# Patient Record
Sex: FEMALE | Race: WHITE | NOT HISPANIC OR LATINO | Employment: OTHER | URBAN - METROPOLITAN AREA
[De-identification: names, ages, dates, MRNs, and addresses within clinical notes are randomized per-mention and may not be internally consistent; named-entity substitution may affect disease eponyms.]

---

## 2018-08-24 ENCOUNTER — TRANSCRIBE ORDERS (OUTPATIENT)
Dept: ADMINISTRATIVE | Facility: HOSPITAL | Age: 63
End: 2018-08-24

## 2018-08-24 DIAGNOSIS — Z12.39 SCREENING BREAST EXAMINATION: Primary | ICD-10-CM

## 2018-09-12 ENCOUNTER — HOSPITAL ENCOUNTER (OUTPATIENT)
Dept: MAMMOGRAPHY | Facility: HOSPITAL | Age: 63
Discharge: HOME | End: 2018-09-12
Payer: COMMERCIAL

## 2018-09-12 DIAGNOSIS — Z12.39 SCREENING BREAST EXAMINATION: ICD-10-CM

## 2018-09-12 PROCEDURE — 77067 SCR MAMMO BI INCL CAD: CPT

## 2018-09-12 PROCEDURE — 77063 BREAST TOMOSYNTHESIS BI: CPT

## 2019-08-20 ENCOUNTER — TRANSCRIBE ORDERS (OUTPATIENT)
Dept: ADMINISTRATIVE | Facility: HOSPITAL | Age: 64
End: 2019-08-20

## 2019-08-20 DIAGNOSIS — Z12.39 ENCOUNTER FOR SCREENING FOR MALIGNANT NEOPLASM OF BREAST: Primary | ICD-10-CM

## 2019-09-16 ENCOUNTER — HOSPITAL ENCOUNTER (OUTPATIENT)
Dept: MAMMOGRAPHY | Facility: HOSPITAL | Age: 64
Discharge: HOME | End: 2019-09-16
Payer: COMMERCIAL

## 2019-09-16 VITALS — HEIGHT: 63 IN | WEIGHT: 173 LBS | BODY MASS INDEX: 30.65 KG/M2

## 2019-09-16 DIAGNOSIS — Z12.39 ENCOUNTER FOR SCREENING FOR MALIGNANT NEOPLASM OF BREAST: ICD-10-CM

## 2019-09-16 PROCEDURE — 77063 BREAST TOMOSYNTHESIS BI: CPT

## 2019-09-16 PROCEDURE — 77067 SCR MAMMO BI INCL CAD: CPT

## 2020-07-09 ENCOUNTER — OFFICE VISIT (OUTPATIENT)
Dept: URGENT CARE | Facility: CLINIC | Age: 65
End: 2020-07-09
Payer: MEDICARE

## 2020-07-09 VITALS
SYSTOLIC BLOOD PRESSURE: 142 MMHG | BODY MASS INDEX: 30.65 KG/M2 | OXYGEN SATURATION: 99 % | RESPIRATION RATE: 16 BRPM | HEART RATE: 75 BPM | DIASTOLIC BLOOD PRESSURE: 73 MMHG | WEIGHT: 173 LBS | TEMPERATURE: 98.9 F | HEIGHT: 63 IN

## 2020-07-09 DIAGNOSIS — M65.342 TRIGGER RING FINGER OF LEFT HAND: Primary | ICD-10-CM

## 2020-07-09 PROCEDURE — G0463 HOSPITAL OUTPT CLINIC VISIT: HCPCS | Performed by: PHYSICIAN ASSISTANT

## 2020-07-09 PROCEDURE — 99203 OFFICE O/P NEW LOW 30 MIN: CPT | Performed by: PHYSICIAN ASSISTANT

## 2020-07-09 RX ORDER — UREA 10 %
1 LOTION (ML) TOPICAL DAILY
COMMUNITY

## 2020-07-09 NOTE — PROGRESS NOTES
[facility]        NAME: Gregoria Cotton is a 72 y o  female  : 1955    MRN: [id number]  DATE: 2020  TIME: 11:30 AM    Assessment and Plan   Trigger ring finger of left hand [M65 [id number]]  1  Trigger ring finger of left hand  Orthopedic injury treatment         Patient Instructions     Naproxen 500mg twice per day  Wear finger splint until seen by ortho specialist  Remove split if it becomes painful, tingles, becomes numb, changes color or temperature  Follow up with PCP in 3-5 days  Proceed to  ER if symptoms worsen  Chief Complaint     Chief Complaint   Patient presents with    Finger Pain     left 4th x 2 days         History of Present Illness       Patient has appointment with ortho specialist for this condition next week  Hand Pain    Incident onset: 2d  There was no injury mechanism  Pain location: L 4th finger  Quality: no pain  The pain does not radiate  The patient is experiencing no pain  Pertinent negatives include no numbness or tingling  The symptoms are aggravated by movement (difficulty and popping sensation at PIP with flexion and extension)  Review of Systems   Review of Systems   Constitutional: Negative for chills and fever  Musculoskeletal: Negative for joint swelling  Skin: Negative for color change and wound  Neurological: Negative for tingling, weakness and numbness  Current Medications       Current Outpatient Medications:     calcium carbonate (OS-TENZIN) 1250 (500 Ca) MG chewable tablet, Chew 1 tablet daily, Disp: , Rfl:     Current Allergies     Allergies as of 2020 - Reviewed 2020   Allergen Reaction Noted    Meperidine  2012            The following portions of the patient's history were reviewed and updated as appropriate: allergies, current medications, past family history, past medical history, past social history, past surgical history and problem list      History reviewed   No pertinent past medical history  History reviewed  No pertinent surgical history  Family History   Problem Relation Age of Onset    Breast cancer Mother 36    No Known Problems Sister     No Known Problems Maternal Grandmother     No Known Problems Paternal Grandmother     No Known Problems Sister     No Known Problems Sister     Cancer Maternal Aunt          Medications have been verified  Objective   /73   Pulse 75   Temp 98 9 °F (37 2 °C)   Resp 16   Ht 5' 3" (1 6 m)   Wt 78 5 kg (173 lb)   SpO2 99%   BMI 30 65 kg/m²          Physical Exam     Physical Exam   Constitutional: She is oriented to person, place, and time  She appears well-developed and well-nourished  No distress  Cardiovascular: Normal rate, regular rhythm, normal heart sounds and intact distal pulses  Exam reveals no gallop and no friction rub  No murmur heard  Pulmonary/Chest: Effort normal and breath sounds normal  No respiratory distress  She has no wheezes  She has no rales  She exhibits no tenderness  Musculoskeletal: Normal range of motion  She exhibits no edema, tenderness or deformity  Occasional delay or "pop" of L 4th PIP with flexion and extension   Neurological: She is alert and oriented to person, place, and time  She has normal reflexes  No sensory deficit  Skin: Skin is warm  No erythema  Psychiatric: She has a normal mood and affect  Her behavior is normal  Judgment and thought content normal    Vitals reviewed  Orthopedic injury treatment  Date/Time: 7/9/2020 11:30 AM  Performed by: Anastacia Thomas PA-C  Authorized by: Anastacia Thomas PA-C     Verbal consent obtained?: Yes    Risks and benefits: Risks, benefits and alternatives were discussed    Consent given by:  Patient  Injury location: L 4th finger    Neurovascular status: Neurovascularly intact    Distal perfusion: normal    Neurological function: normal    Range of motion: reduced    Immobilization:  Splint  Splint type:  Finger splint, static  Neurovascular status: Neurovascularly intact    Distal perfusion: normal    Neurological function: normal    Range of motion: unchanged    Patient tolerance:  Patient tolerated the procedure well with no immediate complications

## 2020-07-09 NOTE — PATIENT INSTRUCTIONS
Naproxen 500mg twice per day  Wear finger splint until seen by ortho specialist  Remove split if it becomes painful, tingles, becomes numb, changes color or temperature  Follow up with PCP in 3-5 days  Proceed to  ER if symptoms worsen  Trigger Finger   WHAT YOU NEED TO KNOW:   Trigger finger is when your finger or thumb gets stuck in a bent position and snaps, pops, or clicks when you straighten it  DISCHARGE INSTRUCTIONS:   Medicines:   · NSAIDs:  These medicines decrease pain and swelling  NSAIDs can be bought without a doctor's order  Ask which medicine is right for you and how much to take  Take as directed  NSAIDs can cause stomach bleeding or kidney problems if not taken correctly  · Take your medicine as directed  Contact your healthcare provider if you think your medicine is not helping or if you have side effects  Tell him of her if you are allergic to any medicine  Keep a list of the medicines, vitamins, and herbs you take  Include the amounts, and when and why you take them  Bring the list or the pill bottles to follow-up visits  Carry your medicine list with you in case of an emergency  Follow up with your healthcare provider or hand specialist as directed:  Write down your questions so you remember to ask them during your visits  Physical therapy:  A physical therapist teaches you exercises to help improve movement and strength, and to decrease pain  Splint:  You may need to wear a splint for up to 6 weeks to keep your finger straight  This will help your finger joints rest and prevent you from bending your finger while you sleep  Contact your healthcare provider or hand specialist if:   · Your symptoms do not go away or they return, even after treatment  · The pain, swelling, or stiffness interferes with your daily activities  · You have more trouble moving your finger  · Your finger is tingling  · You have questions or concerns about your condition or care    Return to the emergency department if:   · You cannot move your finger at all  · Your finger is numb  © 2017 [address]  Information is for End User's use only and may not be sold, redistributed or otherwise used for commercial purposes  All illustrations and images included in CareNotes® are the copyrighted property of [employer]  or [employer]  The above information is an  only  It is not intended as medical advice for individual conditions or treatments  Talk to your doctor, nurse or pharmacist before following any medical regimen to see if it is safe and effective for you

## 2020-07-14 ENCOUNTER — OFFICE VISIT (OUTPATIENT)
Dept: OBGYN CLINIC | Facility: CLINIC | Age: 65
End: 2020-07-14
Payer: MEDICARE

## 2020-07-14 VITALS
DIASTOLIC BLOOD PRESSURE: 85 MMHG | WEIGHT: 173 LBS | HEIGHT: 63 IN | BODY MASS INDEX: 30.65 KG/M2 | SYSTOLIC BLOOD PRESSURE: 129 MMHG | HEART RATE: 65 BPM

## 2020-07-14 DIAGNOSIS — S69.82XA HYPEREXTENSION INJURY OF FINGER OF LEFT HAND: Primary | ICD-10-CM

## 2020-07-14 PROCEDURE — 99203 OFFICE O/P NEW LOW 30 MIN: CPT | Performed by: ORTHOPAEDIC SURGERY

## 2020-07-14 NOTE — PROGRESS NOTES
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 and she noted a snapping at her PIP joint of her left ring finger  Patient states that it does not cause her pain although it is annoying  Pt is also concerned about a tremor that occurs in her hand at times when she holds her phone  PAST MEDICAL HISTORY:  History reviewed  No pertinent past medical history  PAST SURGICAL HISTORY:  History reviewed  No pertinent surgical history  FAMILY HISTORY:  Family History   Problem Relation Age of Onset    Breast cancer Mother 36    No Known Problems Sister     No Known Problems Maternal Grandmother     No Known Problems Paternal Grandmother     No Known Problems Sister     No Known Problems Sister     Cancer Maternal Aunt        SOCIAL HISTORY:  Social History     Tobacco Use    Smoking status: Never Smoker    Smokeless tobacco: Never Used   Substance Use Topics    Alcohol use: Not on file    Drug use: Not on file       MEDICATIONS:    Current Outpatient Medications:     calcium carbonate (OS-TENZIN) 1250 (500 Ca) MG chewable tablet, Chew 1 tablet daily, Disp: , Rfl:     ALLERGIES:  Allergies   Allergen Reactions    Meperidine        REVIEW OF SYSTEMS:  Review of Systems   Constitutional: Negative for chills, fever and unexpected weight change  HENT: Negative for hearing loss, nosebleeds and sore throat  Eyes: Negative for pain, redness and visual disturbance  Respiratory: Negative for cough, shortness of breath and wheezing  Cardiovascular: Negative for chest pain, palpitations and leg swelling  Gastrointestinal: Negative for abdominal pain, nausea and vomiting  Endocrine: Negative for polydipsia and polyuria  Genitourinary: Negative for dysuria and hematuria  Skin: Negative for rash and wound  Neurological: Negative for dizziness and headaches  Psychiatric/Behavioral: Negative for decreased concentration, dysphoric mood and suicidal ideas  The patient is not nervous/anxious  VITALS:  Vitals:    07/14/20 1006   BP: 129/85   Pulse: 65       LABS:  HgA1c: No results found for: HGBA1C  BMP: No results found for: GLUCOSE, CALCIUM, NA, K, CO2, CL, BUN, CREATININE    _____________________________________________________  PHYSICAL EXAMINATION:  General: well developed and well nourished, alert, oriented times 3 and appears comfortable  Psychiatric: Normal  HEENT: Trachea Midline, No torticollis  Pulmonary: No audible wheezing or strider  Cardiovascular: No discernable arrhythmia   Skin: No masses, erythema, lacerations, fluctation, ulcerations  Neurovascular: Sensation Intact to the Median, Ulnar, Radial Nerve, Motor Intact to the Median, Ulnar, Radial Nerve and Pulses Intact    MUSCULOSKELETAL EXAMINATION:  Left ring finger   hyperextension at PIP joint   Extensor tendon snapping noted with flexion at PIP joint  No tenderness at A1 pully no catching or locking     ___________________________________________________  STUDIES REVIEWED:  No studies reviewed  PROCEDURES PERFORMED:  Procedures  No Procedures performed today    _____________________________________________________  ASSESSMENT/PLAN:  Left ring finger extensor tendon irritation   * OT order was placed for a ring to avoid hyperextension and the snapping sensation  * pt was advised to Dc the splint that she is wearing because it likely causes increased irritation  * Pt was advised that if it continues to be bothersome there is a surgical procedure that can be performed    Tension tremor - hand   * Pt was advised that it is likely due to central nervous system          Follow Up:  Return if symptoms worsen or fail to improve        To Do Next Visit:  Re-evaluation of current issue        Scribe Attestation    I,:   Alyse Maguire am acting as a scribe while in the presence of the attending physician :        I,:   Hui Martinez MD personally performed the services described in this documentation    as scribed in my presence :

## 2021-05-27 ENCOUNTER — TRANSCRIBE ORDERS (OUTPATIENT)
Dept: ADMINISTRATIVE | Facility: HOSPITAL | Age: 66
End: 2021-05-27

## 2021-05-27 DIAGNOSIS — Z12.31 SCREENING MAMMOGRAM FOR HIGH-RISK PATIENT: Primary | ICD-10-CM

## 2021-05-28 ENCOUNTER — HOSPITAL ENCOUNTER (OUTPATIENT)
Dept: MAMMOGRAPHY | Facility: HOSPITAL | Age: 66
Discharge: HOME | End: 2021-05-28
Payer: MEDICARE

## 2021-05-28 VITALS — WEIGHT: 165 LBS | BODY MASS INDEX: 29.23 KG/M2 | HEIGHT: 63 IN

## 2021-05-28 DIAGNOSIS — Z12.31 SCREENING MAMMOGRAM FOR HIGH-RISK PATIENT: ICD-10-CM

## 2021-05-28 PROCEDURE — 77063 BREAST TOMOSYNTHESIS BI: CPT

## 2021-05-28 PROCEDURE — 77067 SCR MAMMO BI INCL CAD: CPT

## 2023-05-08 ENCOUNTER — TELEPHONE (OUTPATIENT)
Dept: FAMILY MEDICINE CLINIC | Facility: CLINIC | Age: 68
End: 2023-05-08

## 2023-05-08 NOTE — TELEPHONE ENCOUNTER
Pt is scheduled for new pt appt w/Dr Catalan 06/27/23  Jojo Vee She is asking if Dr Marissa Chavez would order a mammo for her    She is having a little discomfort in left breast  Also asking if routine labs can be ordered so she can get them prior to her appt  Jojo Vee

## 2023-05-08 NOTE — TELEPHONE ENCOUNTER
Would recommend a visit prior to ordering any labs as depending on what we talk about it may change what we check   This is true of breast imaging as well -- depending on what is going on it changes the type of imaging we order

## 2023-05-08 NOTE — TELEPHONE ENCOUNTER
Spoke with Cat Garner and also advised her to check with GYN , since she is established there  They maybe able to order Mammo   Otherwise she will see you in [place]

## 2023-06-27 ENCOUNTER — OFFICE VISIT (OUTPATIENT)
Dept: FAMILY MEDICINE CLINIC | Facility: CLINIC | Age: 68
End: 2023-06-27
Payer: MEDICARE

## 2023-06-27 VITALS
HEART RATE: 76 BPM | DIASTOLIC BLOOD PRESSURE: 78 MMHG | SYSTOLIC BLOOD PRESSURE: 128 MMHG | BODY MASS INDEX: 28.17 KG/M2 | TEMPERATURE: 97.6 F | HEIGHT: 63 IN | OXYGEN SATURATION: 99 % | WEIGHT: 159 LBS

## 2023-06-27 DIAGNOSIS — E46 PROTEIN-CALORIE MALNUTRITION, UNSPECIFIED SEVERITY (HCC): ICD-10-CM

## 2023-06-27 DIAGNOSIS — Z12.11 SCREEN FOR COLON CANCER: ICD-10-CM

## 2023-06-27 DIAGNOSIS — R53.83 OTHER FATIGUE: ICD-10-CM

## 2023-06-27 DIAGNOSIS — L65.9 HAIR LOSS: ICD-10-CM

## 2023-06-27 DIAGNOSIS — L21.9 SEBORRHEIC DERMATITIS: Primary | ICD-10-CM

## 2023-06-27 DIAGNOSIS — L60.8 CHANGE IN NAIL APPEARANCE: ICD-10-CM

## 2023-06-27 DIAGNOSIS — Z12.31 SCREENING MAMMOGRAM FOR BREAST CANCER: ICD-10-CM

## 2023-06-27 DIAGNOSIS — Z00.00 MEDICARE ANNUAL WELLNESS VISIT, INITIAL: ICD-10-CM

## 2023-06-27 DIAGNOSIS — E55.9 VITAMIN D DEFICIENCY: ICD-10-CM

## 2023-06-27 DIAGNOSIS — Z13.1 SCREENING FOR DIABETES MELLITUS: ICD-10-CM

## 2023-06-27 DIAGNOSIS — Z13.220 SCREENING, LIPID: ICD-10-CM

## 2023-06-27 PROCEDURE — 99204 OFFICE O/P NEW MOD 45 MIN: CPT | Performed by: FAMILY MEDICINE

## 2023-06-27 PROCEDURE — G0438 PPPS, INITIAL VISIT: HCPCS | Performed by: FAMILY MEDICINE

## 2023-06-27 RX ORDER — CLOTRIMAZOLE AND BETAMETHASONE DIPROPIONATE 10; .64 MG/G; MG/G
CREAM TOPICAL 2 TIMES DAILY
Qty: 30 G | Refills: 0 | Status: SHIPPED | OUTPATIENT
Start: 2023-06-27

## 2023-06-27 NOTE — PROGRESS NOTES
Assessment and Plan:     Problem List Items Addressed This Visit    None  Visit Diagnoses     Seborrheic dermatitis    -  Primary    Relevant Medications    clotrimazole-betamethasone (LOTRISONE) 1-0 05 % cream    Hair loss        Relevant Orders    CBC and differential    Iron Panel (Includes Ferritin, Iron Sat%, Iron, and TIBC)    TSH, 3rd generation with Free T4 reflex    Change in nail appearance        Relevant Orders    CBC and differential    Iron Panel (Includes Ferritin, Iron Sat%, Iron, and TIBC)    TSH, 3rd generation with Free T4 reflex    Other fatigue        Relevant Orders    CBC and differential    Iron Panel (Includes Ferritin, Iron Sat%, Iron, and TIBC)    TSH, 3rd generation with Free T4 reflex    Vitamin B12    Vitamin D 25 hydroxy    Vitamin D deficiency        Relevant Orders    Vitamin D 25 hydroxy    Protein-calorie malnutrition, unspecified severity ([facility] [address] )        Relevant Orders    Vitamin B12    Medicare annual wellness visit, initial        Screening mammogram for breast cancer        Relevant Orders    Mammo screening bilateral w 3d & cad    Screen for colon cancer        Relevant Orders    Cologuard    Screening for diabetes mellitus        Relevant Orders    Comprehensive metabolic panel    Screening, lipid        Relevant Orders    Lipid panel        Rash suggestive of seborrheic dermatitis -- trial Lotrisone; to f/u if no improvement   Update labs as above to evaluate for possible anemia, thyroid dysfunction, vitamin deficiency given symptoms as below   Tremor appears more at rest, able to stop with intention, no shuffling gait to suggest Parkinsonism -- possible essential tremor, will continue to monitor and evaluate labs as above   Discussed diet sources of protein including meat, diary, protein supplements (which can be used between meals)        Preventive health issues were discussed with patient, and age appropriate screening tests were ordered as noted in patient's After Visit "Summary  Personalized health advice and appropriate referrals for health education or preventive services given if needed, as noted in patient's After Visit Summary  History of Present Illness:     Patient presents to [facility] care and for a Medicare Wellness Visit    HPI     Rash -- first noted in March/April   Not pruritic, painful; occasionally has overlying dry skin, only oozes if she scratches it   Hasn't tried anything on it   No new exposures      Feels like she is not getting enough protein (thinks she is only getting about 30 g/day) -- has hair loss, shakes, nail ridges, poor muscle tone  Also thinks she is getting arthritis in her L hand, can't straighten it out completely  Doesn't hurt but has decreased \"functionality\"     Patient Care Team:  Rebel Hernández DO as PCP - General (Family Medicine)  Rebel Hernández DO (Family Medicine)     Review of Systems:     Review of Systems   Constitutional: Negative for unexpected weight change  HENT: Negative for congestion, ear pain, rhinorrhea and sore throat  Eyes: Negative for visual disturbance  Respiratory: Negative for cough and shortness of breath  Cardiovascular: Negative for chest pain, palpitations and leg swelling  Gastrointestinal: Negative for abdominal pain, constipation and diarrhea  (+) hemorrhoid -- was bothering her more in April, now much less   Endocrine: Negative for polyuria  Genitourinary: Negative for dysuria and vaginal bleeding  Musculoskeletal: Positive for arthralgias  Skin: Positive for rash (around her mouth)  Neurological: Positive for tremors (usually when she is tried; can stop it if she thinks about it)  Negative for dizziness and headaches  Psychiatric/Behavioral: Positive for sleep disturbance (6-7 hours total, but broken up )  Problem List:     There is no problem list on file for this patient  Past Medical and Surgical History:     History reviewed   No pertinent past medical " history  Past Surgical History:   Procedure Laterality Date   • TONSILLECTOMY     • TUBAL LIGATION        Family History:     Family History   Problem Relation Age of Onset   • Breast cancer Mother 36   • Dementia Father    • No Known Problems Sister    • No Known Problems Sister    • No Known Problems Sister    • No Known Problems Maternal Grandmother    • Prostate cancer Maternal Grandfather    • No Known Problems Paternal Grandmother    • Cancer Maternal Aunt       Social History:     Social History     Socioeconomic History   • Marital status: Single     Spouse name: None   • Number of children: None   • Years of education: None   • Highest education level: None   Occupational History   • None   Tobacco Use   • Smoking status: Never   • Smokeless tobacco: Never   Vaping Use   • Vaping Use: Never used   Substance and Sexual Activity   • Alcohol use: Not Currently   • Drug use: Not Currently   • Sexual activity: None   Other Topics Concern   • None   Social History Narrative    Lives with Diamond Grubbs     Retired -- was a /      Social Determinants of Health     Financial Resource Strain: Low Risk  (6/27/2023)    Overall Financial Resource Strain (CARDIA)    • Difficulty of Paying Living Expenses: Not very hard   Food Insecurity: Not on file   Transportation Needs: No Transportation Needs (6/27/2023)    PRAPARE - Transportation    • Lack of Transportation (Medical): No    • Lack of Transportation (Non-Medical):  No   Physical Activity: Not on file   Stress: Not on file   Social Connections: Not on file   Intimate Partner Violence: Not on file   Housing Stability: Not on file      Medications and Allergies:     Current Outpatient Medications   Medication Sig Dispense Refill   • Cholecalciferol 100 MCG (4000 UT) CAPS Take 4,000 mg by mouth daily     • clotrimazole-betamethasone (LOTRISONE) 1-0 05 % cream Apply topically 2 (two) times a day 30 g 0   • [employer] FIBER GUMMIES PO Take by mouth     • Multiple Vitamins-Minerals (Centrum Silver 50+Women) TABS        No current facility-administered medications for this visit  Allergies   Allergen Reactions   • Meperidine    • Pistachio Nut (Diagnostic) - Food Allergy Hives      Immunizations:     Immunization History   Administered Date(s) Administered   • COVID-19 J&J (Baltazar) vaccine 0 5 mL 04/05/2021, 10/29/2021   • COVID-19 MODERNA VACC 0 5 ML IM 10/10/2022   • H1N1, All Formulations 10/23/2009   • INFLUENZA 09/28/2014, 10/21/2015, 10/04/2016   • Influenza Split High Dose Preservative Free IM 10/15/2020   • Tdap 09/29/2016   • Tetanus, adsorbed 01/01/2006   • Zoster 12/06/2013      Health Maintenance:         Topic Date Due   • Colorectal Cancer Screening  Never done   • Breast Cancer Screening: Mammogram  05/28/2022   • Hepatitis C Screening  06/27/2024 (Originally 1955)         Topic Date Due   • Pneumococcal Vaccine: 65+ Years (1 - PCV) Never done   • COVID-19 Vaccine (4 - Booster for Baltazar series) 12/05/2022   • Influenza Vaccine (Season Ended) 09/01/2023      Medicare Screening Tests and Risk Assessments:     Hattie Kaufman is here for her Subsequent Wellness visit  Health Risk Assessment:   Patient rates overall health as fair  Patient feels that their physical health rating is slightly worse  Patient is satisfied with their life  Eyesight was rated as same  Hearing was rated as same  Patient feels that their emotional and mental health rating is same  Patients states they are never, rarely angry  Patient states they are always unusually tired/fatigued  Pain experienced in the last 7 days has been none  Patient states that she has experienced no weight loss or gain in last 6 months  Depression Screening:   PHQ-2 Score: 0      Fall Risk Screening: In the past year, patient has experienced: no history of falling in past year      Urinary Incontinence Screening:   Patient has not leaked urine accidently in the last six months       Home Safety:  Patient does not have trouble with stairs inside or outside of their home  Patient has working smoke alarms and has working carbon monoxide detector  Nutrition:   Current diet is Regular  Medications:   Patient is currently taking over-the-counter supplements  OTC medications include: see medication list  Patient is able to manage medications  Activities of Daily Living (ADLs)/Instrumental Activities of Daily Living (IADLs):   Walk and transfer into and out of bed and chair?: Yes  Dress and groom yourself?: Yes    Bathe or shower yourself?: Yes    Feed yourself? Yes  Do your laundry/housekeeping?: Yes  Manage your money, pay your bills and track your expenses?: Yes  Make your own meals?: Yes    Do your own shopping?: Yes    Durable Medical Equipment Suppliers  N/A    Previous Hospitalizations:   Any hospitalizations or ED visits within the last 12 months?: No      Advance Care Planning:   Living will: No      Cognitive Screening:   Provider or family/friend/caregiver concerned regarding cognition?: No    PREVENTIVE SCREENINGS      Cardiovascular Screening:      Due for: Lipid Panel      Diabetes Screening:       Due for: Blood Glucose      Colorectal Cancer Screening:       Due for: Cologuard      Breast Cancer Screening:       Due for: Mammogram        Cervical Cancer Screening:    General: Screening Not Indicated      Osteoporosis Screening:    General: Patient Declines    Due for: Bone Density Ultrasound      Abdominal Aortic Aneurysm (AAA) Screening:        General: Screening Not Indicated      Lung Cancer Screening:     General: Screening Not Indicated      Hepatitis C Screening:    General: Patient Declines    Screening, Brief Intervention, and Referral to Treatment (SBIRT)    Screening  Typical number of drinks in a day: 0  Typical number of drinks in a week: 0  Interpretation: Low risk drinking behavior  No results found       Physical Exam:     /78   Pulse 76   Temp 97 6 °F "(36 4 °C)   Ht 5' 3\" (1 6 m)   Wt 72 1 kg (159 lb)   SpO2 99%   BMI 28 17 kg/m²     Physical Exam  Vitals and nursing note reviewed  Constitutional:       General: She is not in acute distress  Appearance: She is well-developed  HENT:      Head: Normocephalic and atraumatic  Comments: Area of mildly erythematous, dry peeling skin patches in distribution as above suggestive of seborrheic dermatitis      Right Ear: Tympanic membrane, ear canal and external ear normal       Left Ear: Tympanic membrane, ear canal and external ear normal       Nose: Nose normal  No rhinorrhea  Mouth/Throat:      Mouth: Mucous membranes are moist       Pharynx: No oropharyngeal exudate or posterior oropharyngeal erythema  Eyes:      Conjunctiva/sclera: Conjunctivae normal    Neck:      Thyroid: No thyromegaly  Cardiovascular:      Rate and Rhythm: Normal rate and regular rhythm  Pulmonary:      Effort: Pulmonary effort is normal  No respiratory distress  Breath sounds: Normal breath sounds  Abdominal:      General: Bowel sounds are normal  There is no distension  Palpations: Abdomen is soft  Tenderness: There is no abdominal tenderness  Musculoskeletal:         General: Normal range of motion  Lymphadenopathy:      Cervical: No cervical adenopathy  Skin:     General: Skin is warm and dry  Neurological:      General: No focal deficit present  Mental Status: She is alert  Motor: Tremor (B/L hands L>R, able to stop with intention) present        Gait: Gait normal    Psychiatric:         Mood and Affect: Mood normal        COVID completed primary + booster, TDap UTD, Shingles deferred, [place] completed 13, is going to     [employer], DO  "

## 2023-06-27 NOTE — PATIENT INSTRUCTIONS
Medicare Preventive Visit Patient Instructions  Thank you for completing your Welcome to Medicare Visit or Medicare Annual Wellness Visit today  Your next wellness visit will be due in one year (6/27/2024)  The screening/preventive services that you may require over the next 5-10 years are detailed below  Some tests may not apply to you based off risk factors and/or age  Screening tests ordered at today's visit but not completed yet may show as past due  Also, please note that scanned in results may not display below  Preventive Screenings:  Service Recommendations Previous Testing/Comments   Colorectal Cancer Screening  * Colonoscopy    * Fecal Occult Blood Test (FOBT)/Fecal Immunochemical Test (FIT)  * Fecal DNA/Cologuard Test  * Flexible Sigmoidoscopy Age: 39-70 years old   Colonoscopy: every 10 years (may be performed more frequently if at higher risk)  OR  FOBT/FIT: every 1 year  OR  Cologuard: every 3 years  OR  Sigmoidoscopy: every 5 years  Screening may be recommended earlier than age 39 if at higher risk for colorectal cancer  Also, an individualized decision between you and your healthcare provider will decide whether screening between the ages of 74-80 would be appropriate  Colonoscopy: Not on file  FOBT/FIT: Not on file  Cologuard: Not on file  Sigmoidoscopy: Not on file          Breast Cancer Screening Age: 36 years old  Frequency: every 1-2 years  Not required if history of left and right mastectomy Mammogram: 05/28/2021        Cervical Cancer Screening Between the ages of 21-29, pap smear recommended once every 3 years  Between the ages of 33-67, can perform pap smear with HPV co-testing every 5 years     Recommendations may differ for women with a history of total hysterectomy, cervical cancer, or abnormal pap smears in past  Pap Smear: Not on file    Screening Not Indicated   Hepatitis C Screening Once for adults born between [facility]  More frequently in patients at high risk for Hepatitis C Hep C Antibody: Not on file        Diabetes Screening 1-2 times per year if you're at risk for diabetes or have pre-diabetes Fasting glucose: No results in last 5 years (No results in last 5 years)  A1C: No results in last 5 years (No results in last 5 years)      Cholesterol Screening Once every 5 years if you don't have a lipid disorder  May order more often based on risk factors  Lipid panel: Not on file          Other Preventive Screenings Covered by Medicare:  1  Abdominal Aortic Aneurysm (AAA) Screening: covered once if your at risk  You're considered to be at risk if you have a family history of AAA  2  Lung Cancer Screening: covers low dose CT scan once per year if you meet all of the following conditions: (1) Age 50-69; (2) No signs or symptoms of lung cancer; (3) Current smoker or have quit smoking within the last 15 years; (4) You have a tobacco smoking history of at least 20 pack years (packs per day multiplied by number of years you smoked); (5) You get a written order from a healthcare provider  3  Glaucoma Screening: covered annually if you're considered high risk: (1) You have diabetes OR (2) Family history of glaucoma OR (3)  aged 48 and older OR (3)  American aged 72 and older  3  Osteoporosis Screening: covered every 2 years if you meet one of the following conditions: (1) You're estrogen deficient and at risk for osteoporosis based off medical history and other findings; (2) Have a vertebral abnormality; (3) On glucocorticoid therapy for more than 3 months; (4) Have primary hyperparathyroidism; (5) On osteoporosis medications and need to assess response to drug therapy  · Last bone density test (DXA Scan): Not on file  5  HIV Screening: covered annually if you're between the age of 12-76  Also covered annually if you are younger than 13 and older than 72 with risk factors for HIV infection   For pregnant patients, it is covered up to 3 times per pregnancy  Immunizations:  Immunization Recommendations   Influenza Vaccine Annual influenza vaccination during flu season is recommended for all persons aged >= 6 months who do not have contraindications   Pneumococcal Vaccine   * Pneumococcal conjugate vaccine = PCV13 (Prevnar 13), PCV15 (Vaxneuvance), PCV20 (Prevnar 20)  * Pneumococcal polysaccharide vaccine = PPSV23 (Pneumovax) Adults 25-60 years old: 1-3 doses may be recommended based on certain risk factors  Adults 72 years old: 1-2 doses may be recommended based off what pneumonia vaccine you previously received   Hepatitis B Vaccine 3 dose series if at intermediate or high risk (ex: diabetes, end stage renal disease, liver disease)   Tetanus (Td) Vaccine - COST NOT COVERED BY MEDICARE PART B Following completion of primary series, a booster dose should be given every 10 years to maintain immunity against tetanus  Td may also be given as tetanus wound prophylaxis  Tdap Vaccine - COST NOT COVERED BY MEDICARE PART B Recommended at least once for all adults  For pregnant patients, recommended with each pregnancy  Shingles Vaccine (Shingrix) - COST NOT COVERED BY MEDICARE PART B  2 shot series recommended in those aged 48 and above     Health Maintenance Due:      Topic Date Due   • Hepatitis C Screening  Never done   • Colorectal Cancer Screening  Never done   • Breast Cancer Screening: Mammogram  05/28/2022     Immunizations Due:      Topic Date Due   • Pneumococcal Vaccine: 65+ Years (1 - PCV) Never done   • COVID-19 Vaccine (2 - Booster for [place] series) 12/24/2021   • Influenza Vaccine (Season Ended) 09/01/2023     Advance Directives   What are advance directives? Advance directives are legal documents that state your wishes and plans for medical care  These plans are made ahead of time in case you lose your ability to make decisions for yourself   Advance directives can apply to any medical decision, such as the treatments you want, and if you want to donate organs  What are the types of advance directives? There are many types of advance directives, and each state has rules about how to use them  You may choose a combination of any of the following:  · Living will: This is a written record of the treatment you want  You can also choose which treatments you do not want, which to limit, and which to stop at a certain time  This includes surgery, medicine, IV fluid, and tube feedings  · Durable power of  for healthcare [facility]): This is a written record that states who you want to make healthcare choices for you when you are unable to make them for yourself  This person, called a proxy, is usually a family member or a friend  You may choose more than 1 proxy  · Do not resuscitate (DNR) order:  A DNR order is used in case your heart stops beating or you stop breathing  It is a request not to have certain forms of treatment, such as CPR  A DNR order may be included in other types of advance directives  · Medical directive: This covers the care that you want if you are in a coma, near death, or unable to make decisions for yourself  You can list the treatments you want for each condition  Treatment may include pain medicine, surgery, blood transfusions, dialysis, IV or tube feedings, and a ventilator (breathing machine)  · Values history: This document has questions about your views, beliefs, and how you feel and think about life  This information can help others choose the care that you would choose  Why are advance directives important? An advance directive helps you control your care  Although spoken wishes may be used, it is better to have your wishes written down  Spoken wishes can be misunderstood, or not followed  Treatments may be given even if you do not want them  An advance directive may make it easier for your family to make difficult choices about your care         © Copyright [employer] 2018 Information is for End User's use only and may not be sold, redistributed or otherwise used for commercial purposes   All illustrations and images included in CareNotes® are the copyrighted property of A D A M , Inc  or [facility] Alma Delia Santos

## 2023-06-28 ENCOUNTER — APPOINTMENT (OUTPATIENT)
Dept: LAB | Facility: CLINIC | Age: 68
End: 2023-06-28
Payer: MEDICARE

## 2023-06-28 DIAGNOSIS — R53.83 OTHER FATIGUE: ICD-10-CM

## 2023-06-28 DIAGNOSIS — Z13.220 SCREENING, LIPID: ICD-10-CM

## 2023-06-28 DIAGNOSIS — Z13.1 SCREENING FOR DIABETES MELLITUS: ICD-10-CM

## 2023-06-28 DIAGNOSIS — L60.8 CHANGE IN NAIL APPEARANCE: ICD-10-CM

## 2023-06-28 DIAGNOSIS — E55.9 VITAMIN D DEFICIENCY: ICD-10-CM

## 2023-06-28 DIAGNOSIS — L65.9 HAIR LOSS: ICD-10-CM

## 2023-06-28 DIAGNOSIS — E46 PROTEIN-CALORIE MALNUTRITION, UNSPECIFIED SEVERITY (HCC): ICD-10-CM

## 2023-06-28 LAB
25(OH)D3 SERPL-MCNC: 73.9 NG/ML (ref 30–100)
ALBUMIN SERPL BCP-MCNC: 3.9 G/DL (ref 3.5–5)
ALP SERPL-CCNC: 71 U/L (ref 46–116)
ALT SERPL W P-5'-P-CCNC: 20 U/L (ref 12–78)
ANION GAP SERPL CALCULATED.3IONS-SCNC: 5 MMOL/L
AST SERPL W P-5'-P-CCNC: 13 U/L (ref 5–45)
BASOPHILS # BLD AUTO: 0.08 THOUSANDS/ÂΜL (ref 0–0.1)
BASOPHILS NFR BLD AUTO: 1 % (ref 0–1)
BILIRUB SERPL-MCNC: 0.61 MG/DL (ref 0.2–1)
BUN SERPL-MCNC: 17 MG/DL (ref 5–25)
CALCIUM SERPL-MCNC: 9.4 MG/DL (ref 8.3–10.1)
CHLORIDE SERPL-SCNC: 107 MMOL/L (ref 96–108)
CHOLEST SERPL-MCNC: 268 MG/DL
CO2 SERPL-SCNC: 27 MMOL/L (ref 21–32)
CREAT SERPL-MCNC: 1.01 MG/DL (ref 0.6–1.3)
EOSINOPHIL # BLD AUTO: 0.13 THOUSAND/ÂΜL (ref 0–0.61)
EOSINOPHIL NFR BLD AUTO: 2 % (ref 0–6)
ERYTHROCYTE [DISTWIDTH] IN BLOOD BY AUTOMATED COUNT: 12.3 % (ref 11.6–15.1)
FERRITIN SERPL-MCNC: 67 NG/ML (ref 11–307)
GFR SERPL CREATININE-BSD FRML MDRD: 57 ML/MIN/1.73SQ M
GLUCOSE P FAST SERPL-MCNC: 101 MG/DL (ref 65–99)
HCT VFR BLD AUTO: 41.5 % (ref 34.8–46.1)
HDLC SERPL-MCNC: 74 MG/DL
HGB BLD-MCNC: 13.5 G/DL (ref 11.5–15.4)
IMM GRANULOCYTES # BLD AUTO: 0.02 THOUSAND/UL (ref 0–0.2)
IMM GRANULOCYTES NFR BLD AUTO: 0 % (ref 0–2)
IRON SATN MFR SERPL: 26 % (ref 15–50)
IRON SERPL-MCNC: 83 UG/DL (ref 50–170)
LDLC SERPL CALC-MCNC: 175 MG/DL (ref 0–100)
LYMPHOCYTES # BLD AUTO: 2.69 THOUSANDS/ÂΜL (ref 0.6–4.47)
LYMPHOCYTES NFR BLD AUTO: 41 % (ref 14–44)
MCH RBC QN AUTO: 30.5 PG (ref 26.8–34.3)
MCHC RBC AUTO-ENTMCNC: 32.5 G/DL (ref 31.4–37.4)
MCV RBC AUTO: 94 FL (ref 82–98)
MONOCYTES # BLD AUTO: 0.53 THOUSAND/ÂΜL (ref 0.17–1.22)
MONOCYTES NFR BLD AUTO: 8 % (ref 4–12)
NEUTROPHILS # BLD AUTO: 3.2 THOUSANDS/ÂΜL (ref 1.85–7.62)
NEUTS SEG NFR BLD AUTO: 48 % (ref 43–75)
NONHDLC SERPL-MCNC: 194 MG/DL
NRBC BLD AUTO-RTO: 0 /100 WBCS
PLATELET # BLD AUTO: 321 THOUSANDS/UL (ref 149–390)
PMV BLD AUTO: 9.9 FL (ref 8.9–12.7)
POTASSIUM SERPL-SCNC: 4 MMOL/L (ref 3.5–5.3)
PROT SERPL-MCNC: 6.9 G/DL (ref 6.4–8.4)
RBC # BLD AUTO: 4.42 MILLION/UL (ref 3.81–5.12)
SODIUM SERPL-SCNC: 139 MMOL/L (ref 135–147)
TIBC SERPL-MCNC: 325 UG/DL (ref 250–450)
TRIGL SERPL-MCNC: 95 MG/DL
TSH SERPL DL<=0.05 MIU/L-ACNC: 1.82 UIU/ML (ref 0.45–4.5)
VIT B12 SERPL-MCNC: 281 PG/ML (ref 180–914)
WBC # BLD AUTO: 6.65 THOUSAND/UL (ref 4.31–10.16)

## 2023-06-28 PROCEDURE — 83540 ASSAY OF IRON: CPT

## 2023-06-28 PROCEDURE — 82607 VITAMIN B-12: CPT

## 2023-06-28 PROCEDURE — 85025 COMPLETE CBC W/AUTO DIFF WBC: CPT

## 2023-06-28 PROCEDURE — 36415 COLL VENOUS BLD VENIPUNCTURE: CPT

## 2023-06-28 PROCEDURE — 83550 IRON BINDING TEST: CPT

## 2023-06-28 PROCEDURE — 80061 LIPID PANEL: CPT

## 2023-06-28 PROCEDURE — 84443 ASSAY THYROID STIM HORMONE: CPT

## 2023-06-28 PROCEDURE — 82306 VITAMIN D 25 HYDROXY: CPT

## 2023-06-28 PROCEDURE — 82728 ASSAY OF FERRITIN: CPT

## 2023-06-28 PROCEDURE — 80053 COMPREHEN METABOLIC PANEL: CPT

## 2023-07-16 LAB — COLOGUARD RESULT REPORTABLE: NEGATIVE

## 2023-12-05 ENCOUNTER — TELEPHONE (OUTPATIENT)
Dept: FAMILY MEDICINE CLINIC | Facility: CLINIC | Age: 68
End: 2023-12-05

## 2023-12-07 ENCOUNTER — EVALUATION (OUTPATIENT)
Dept: PHYSICAL THERAPY | Age: 68
End: 2023-12-07
Payer: MEDICARE

## 2023-12-07 ENCOUNTER — APPOINTMENT (OUTPATIENT)
Dept: LAB | Facility: CLINIC | Age: 68
End: 2023-12-07
Payer: MEDICARE

## 2023-12-07 ENCOUNTER — APPOINTMENT (OUTPATIENT)
Dept: RADIOLOGY | Facility: CLINIC | Age: 68
End: 2023-12-07
Payer: MEDICARE

## 2023-12-07 ENCOUNTER — OFFICE VISIT (OUTPATIENT)
Dept: FAMILY MEDICINE CLINIC | Facility: CLINIC | Age: 68
End: 2023-12-07
Payer: MEDICARE

## 2023-12-07 VITALS
WEIGHT: 163 LBS | SYSTOLIC BLOOD PRESSURE: 122 MMHG | DIASTOLIC BLOOD PRESSURE: 82 MMHG | HEIGHT: 63 IN | TEMPERATURE: 97 F | HEART RATE: 86 BPM | OXYGEN SATURATION: 98 % | BODY MASS INDEX: 28.88 KG/M2

## 2023-12-07 DIAGNOSIS — R26.9 ABNORMAL GAIT: ICD-10-CM

## 2023-12-07 DIAGNOSIS — R25.1 TREMOR: Primary | ICD-10-CM

## 2023-12-07 DIAGNOSIS — Z13.1 SCREENING FOR DIABETES MELLITUS: ICD-10-CM

## 2023-12-07 DIAGNOSIS — R35.0 URINARY FREQUENCY: ICD-10-CM

## 2023-12-07 DIAGNOSIS — M25.642 STIFFNESS OF LEFT HAND JOINT: ICD-10-CM

## 2023-12-07 DIAGNOSIS — Z13.220 SCREENING, LIPID: ICD-10-CM

## 2023-12-07 DIAGNOSIS — E53.8 VITAMIN B12 DEFICIENCY: ICD-10-CM

## 2023-12-07 DIAGNOSIS — R53.83 OTHER FATIGUE: ICD-10-CM

## 2023-12-07 DIAGNOSIS — R25.1 TREMOR: ICD-10-CM

## 2023-12-07 DIAGNOSIS — R35.0 URINARY FREQUENCY: Primary | ICD-10-CM

## 2023-12-07 LAB
ALBUMIN SERPL BCP-MCNC: 4.4 G/DL (ref 3.5–5)
ALP SERPL-CCNC: 69 U/L (ref 34–104)
ALT SERPL W P-5'-P-CCNC: 15 U/L (ref 7–52)
ANION GAP SERPL CALCULATED.3IONS-SCNC: 10 MMOL/L
AST SERPL W P-5'-P-CCNC: 14 U/L (ref 13–39)
BASOPHILS # BLD AUTO: 0.05 THOUSANDS/ÂΜL (ref 0–0.1)
BASOPHILS NFR BLD AUTO: 1 % (ref 0–1)
BILIRUB SERPL-MCNC: 0.53 MG/DL (ref 0.2–1)
BUN SERPL-MCNC: 22 MG/DL (ref 5–25)
CALCIUM SERPL-MCNC: 10.1 MG/DL (ref 8.4–10.2)
CHLORIDE SERPL-SCNC: 104 MMOL/L (ref 96–108)
CHOLEST SERPL-MCNC: 239 MG/DL
CO2 SERPL-SCNC: 28 MMOL/L (ref 21–32)
CREAT SERPL-MCNC: 0.87 MG/DL (ref 0.6–1.3)
EOSINOPHIL # BLD AUTO: 0.09 THOUSAND/ÂΜL (ref 0–0.61)
EOSINOPHIL NFR BLD AUTO: 1 % (ref 0–6)
ERYTHROCYTE [DISTWIDTH] IN BLOOD BY AUTOMATED COUNT: 12.5 % (ref 11.6–15.1)
GFR SERPL CREATININE-BSD FRML MDRD: 68 ML/MIN/1.73SQ M
GLUCOSE P FAST SERPL-MCNC: 99 MG/DL (ref 65–99)
HCT VFR BLD AUTO: 41.5 % (ref 34.8–46.1)
HDLC SERPL-MCNC: 74 MG/DL
HGB BLD-MCNC: 13.8 G/DL (ref 11.5–15.4)
IMM GRANULOCYTES # BLD AUTO: 0.04 THOUSAND/UL (ref 0–0.2)
IMM GRANULOCYTES NFR BLD AUTO: 1 % (ref 0–2)
LDLC SERPL CALC-MCNC: 148 MG/DL (ref 0–100)
LYMPHOCYTES # BLD AUTO: 2.64 THOUSANDS/ÂΜL (ref 0.6–4.47)
LYMPHOCYTES NFR BLD AUTO: 38 % (ref 14–44)
MCH RBC QN AUTO: 31.8 PG (ref 26.8–34.3)
MCHC RBC AUTO-ENTMCNC: 33.3 G/DL (ref 31.4–37.4)
MCV RBC AUTO: 96 FL (ref 82–98)
MONOCYTES # BLD AUTO: 0.48 THOUSAND/ÂΜL (ref 0.17–1.22)
MONOCYTES NFR BLD AUTO: 7 % (ref 4–12)
NEUTROPHILS # BLD AUTO: 3.67 THOUSANDS/ÂΜL (ref 1.85–7.62)
NEUTS SEG NFR BLD AUTO: 52 % (ref 43–75)
NRBC BLD AUTO-RTO: 0 /100 WBCS
PLATELET # BLD AUTO: 341 THOUSANDS/UL (ref 149–390)
PMV BLD AUTO: 10.1 FL (ref 8.9–12.7)
POTASSIUM SERPL-SCNC: 3.8 MMOL/L (ref 3.5–5.3)
PROT SERPL-MCNC: 6.5 G/DL (ref 6.4–8.4)
RBC # BLD AUTO: 4.34 MILLION/UL (ref 3.81–5.12)
SODIUM SERPL-SCNC: 142 MMOL/L (ref 135–147)
TRIGL SERPL-MCNC: 87 MG/DL
WBC # BLD AUTO: 6.97 THOUSAND/UL (ref 4.31–10.16)

## 2023-12-07 PROCEDURE — 80053 COMPREHEN METABOLIC PANEL: CPT

## 2023-12-07 PROCEDURE — 97162 PT EVAL MOD COMPLEX 30 MIN: CPT | Performed by: PHYSICAL THERAPIST

## 2023-12-07 PROCEDURE — 82607 VITAMIN B-12: CPT

## 2023-12-07 PROCEDURE — 36415 COLL VENOUS BLD VENIPUNCTURE: CPT

## 2023-12-07 PROCEDURE — 97530 THERAPEUTIC ACTIVITIES: CPT | Performed by: PHYSICAL THERAPIST

## 2023-12-07 PROCEDURE — 80061 LIPID PANEL: CPT

## 2023-12-07 PROCEDURE — 99214 OFFICE O/P EST MOD 30 MIN: CPT | Performed by: FAMILY MEDICINE

## 2023-12-07 PROCEDURE — 84443 ASSAY THYROID STIM HORMONE: CPT

## 2023-12-07 PROCEDURE — 97110 THERAPEUTIC EXERCISES: CPT | Performed by: PHYSICAL THERAPIST

## 2023-12-07 PROCEDURE — 73130 X-RAY EXAM OF HAND: CPT

## 2023-12-07 PROCEDURE — 85025 COMPLETE CBC W/AUTO DIFF WBC: CPT

## 2023-12-07 NOTE — PROGRESS NOTES
PT Evaluation     Today's date: 2023  Patient name: Rosemary Morse  : 1955  MRN: [id number]  Referring provider: Delfino Suero DO  Dx:   Encounter Diagnosis     ICD-10-CM    1. Urinary frequency  R35.0           Start Time: 1125  Stop Time: 1220  Total time in clinic (min): 55 minutes    Assessment  Assessment details: Noemy Gomez is a 76year old female presenting today with concerns of urinary frequency. Notes decline in gait and mobility and has increased urinary frequency to first urge for fear of not getting to the bathroom in time. Nocturia up to 4 times per night near every two hours. Time spent in patient education regarding toileting body mechanics, PFM anatomy, bladder health including adequate fluid intake, avoiding bladder irritants and urge suppression techniques. Patient could benefit from a trial of PFPT to address presenting impairments and functional limitations and improve overall quality of life. Direct PFM examination deferred until next treatment session. Barriers to therapy: Limited mobility  Back pain  Gait dysfunction  Freezing episodes  Understanding of Dx/Px/POC: good   Prognosis: fair    Goals  ST. Patient will demonstrate independence in an ongoing home exercise program that will be ongoing throughout episode of care. 2.. Patient will complete a two day bladder diary within two weeks. 3.  Patient will demonstrate proper posture for best voiding and defecation within two weeks. LT. Patient will demonstrate use of a functional PFM contraction by performing a pre-contraction ("knack") prior to transfers  2. Patient will perform 30 minutes of exercise without urinary leakage. 3.  Patient will have increased time of  3  hours between voids for increased participation in social and recreational activities  and improved sleep within twelve weeks. 4.  Increased PFM strength to  3/5    at 5 second hold for 10 repetitions within 12 weeks   5.  Positive use of urge suppression techniques to increase void interval  6. Increase fluid intake to 50-60 ounces of mostly non-irritant per day with limiting fluids 3 hours prior to bedtime    Plan  Patient would benefit from: skilled physical therapy  Planned therapy interventions: manual therapy, motor coordination training, neuromuscular re-education, patient education, behavior modification, therapeutic activities, therapeutic exercise and home exercise program  Frequency: 1x week  Duration in visits: 8  Plan of Care beginning date: 12/7/2023  Plan of Care expiration date: 3/6/2024        PT Pelvic Floor Subjective:   History of Present Illness:   Patient notes for greater than one year notes urinating every two hours during the day and night. Notes occasional leakage on the way to the bathroom but happening less than once per week. Wears liner when out in community. Most bothered by nocturia. Quality of life: good    Social Support:     Lives in:  Multiple-level home    Lives with:  Significant other    Relationship status: domestic partnership    Work status: retired    Life stress severity: mild  Diet and Exercise:      Used to walk at home around pool 5/8 of a mile but over the last month less because of fatigue and back pain and limited mobility  OB/ gyn History    Gestational History:     Number of term pregnancies: 0    Menstrual History:      Menopause: age 58. Bladder Function:     Voiding Difficulties positive for: frequent urination  Urgency: occasionally. Voiding Difficulties comments:     Voiding frequency: every 1-2 hours    Urinary leakage: urine leakage    Nocturia (episodes per night): 4 (every two hours)    Fluid Intake Type: Water and soda    Intake (ounces):  Water: 40, Soda: 12,     Intake (ounces) comment: Every other day caffeine free soda  Ensure every third day 8 ounces  Incontinence Management:     Pads/Diapers Additional Comments: liners when out in community otherwise no bladder protection  Bowel Function:     Voiding DIfficulties: constipation      Bowel Function comments:  Recently started using a raised toilet seat with now more difficulty with defecation although greater ease with transfers    Bowel frequency: daily    Uses "squatty potty": no Squatty Potty (advised use today)  Sexual Function:     Sexually Active:  Not sexually active  Pain:     Location:  Notes occasional left hip pain with sleeping; back pain daily  Patient Goals:     Patient goals for therapy:  Improved sleep, improved bladder or bowel function and improved quality of life    Other patient goals:  Less urinary frequency      Objective     Concurrent Complaints  Positive for disturbed sleep.  Negative for night pain, bowel dysfunction and saddle (S4) numbness    Postural Observations  Seated posture: poor  Standing posture: poor    Additional Postural Observation Details  Significant forward head, increased thoracic kyphosis and forward flexed in sitting    Neurological Testing     Sensation     Lumbar   Left   Intact: light touch    Right   Intact: light touch    Strength/Myotome Testing     Left Hip   Planes of Motion   Flexion: 4-  Abduction: 3+  Adduction: 4-    Right Hip   Planes of Motion   Flexion: 4-  Abduction: 3+  Adduction: 4-    Left Knee   Flexion: 4-  Extension: 4-    Right Knee   Flexion: 4-  Extension: 4-      Abdominal Assessment:      Abdominal Assessment: Deferred until next treatment session                   POC expires: 3/1/24  Date 12/7            Visit count 1            OSMAR 36                 Precautions: gait dysfunction, fall risk      Manuals 12/7                                                                Neuro Re-Ed                                                                                                        Ther Ex             CRK                                                                                                        Ther Activity             PFM education PP Bladder health education PP with ROSSY gomez PP                         Gait Training                                       Modalities

## 2023-12-07 NOTE — PROGRESS NOTES
Sabine Hawkins 1955 female MRN: [id number]      ASSESSMENT/PLAN  Problem List Items Addressed This Visit    None  Visit Diagnoses     Tremor    -  Primary    Relevant Orders    Ambulatory Referral to Neurology    Ambulatory Referral to Physical Therapy    CBC and differential    TSH, 3rd generation with Free T4 reflex    Vitamin B12    Other fatigue        Relevant Orders    Ambulatory Referral to Neurology    Ambulatory Referral to Physical Therapy    CBC and differential    TSH, 3rd generation with Free T4 reflex    Vitamin B12    Abnormal gait        Relevant Orders    Ambulatory Referral to Neurology    Ambulatory Referral to Physical Therapy    CBC and differential    TSH, 3rd generation with Free T4 reflex    Vitamin B12    Stiffness of left hand joint        Relevant Orders    XR hand 3+ vw left    Ambulatory referral to Hand Surgery    Urinary frequency        Relevant Orders    Ambulatory Referral to Physical Therapy    Screening for diabetes mellitus        Relevant Orders    Comprehensive metabolic panel    Screening, lipid        Relevant Orders    Lipid Panel with Direct LDL reflex    Vitamin B12 deficiency        Relevant Orders    Vitamin B12        Tremor and shuffling gait concerning for possible Parkinsonism -- will refer to Neuro for further evaluation (staff message sent to provider). Will also refer to PT to help with strength/stamina/fall prevention in meantime. Will XR L hand to evaluate for bony changes, though exam somewhat suggestive of contracture -- refer to Ortho to discuss possible injections, discussed trial of topical voltaren for discomfort. Reviewed treatment options for OAB symptoms including medication, pelvic floor PT, and/or referral to Urogyn. Pt agreeable to trial of PT -- referral provided. Update labs as above. No future appointments. SUBJECTIVE  CC: Fatigue (Has had fatigue since summer-was started on b12 and increased protein in diet.  Feels that it is much worse since fall. ), Gait Problem, and Hand Pain (arthritis)      HPI:  Artie Durand is a 76 y.o. female who presents with her partner due to multiple concern as detailed below. Pt notes she is very tired, and she used to be an active person   She continues to have a tremor, but feels it is worsening -- notes it is more present when she is tense, but she is able to stop it if she thinks about it, does not bother her at night   Having difficulty walking -- when she first gets up she is "frozen" and then has a shuffling gait more often than not, has trouble turning   Back hurts with standing for more than a few minutes   Stiffness in L hand -- thinks it is due to arthritis   Has to void every 2 hours     Review of Systems   Constitutional:  Positive for fatigue. Genitourinary:  Positive for frequency. Negative for difficulty urinating, dysuria and hematuria. Musculoskeletal:  Positive for arthralgias, back pain and gait problem. Neurological:  Positive for tremors. Historical Information   The patient history was reviewed and updated as follows:    History reviewed. No pertinent past medical history.   Past Surgical History:   Procedure Laterality Date   • TONSILLECTOMY     • TUBAL LIGATION       Family History   Problem Relation Age of Onset   • Breast cancer Mother 36   • Dementia Father    • No Known Problems Sister    • No Known Problems Sister    • No Known Problems Sister    • No Known Problems Maternal Grandmother    • Prostate cancer Maternal Grandfather    • No Known Problems Paternal Grandmother    • Cancer Maternal Aunt       Social History   Social History     Substance and Sexual Activity   Alcohol Use Not Currently     Social History     Substance and Sexual Activity   Drug Use Not Currently     Social History     Tobacco Use   Smoking Status Never   Smokeless Tobacco Never       Medications:     Current Outpatient Medications:   •  Cholecalciferol 100 MCG (4000 UT) CAPS, Take 4,000 mg by mouth daily, Disp: , Rfl:   •  clotrimazole-betamethasone (LOTRISONE) 1-0.05 % cream, Apply topically 2 (two) times a day, Disp: 30 g, Rfl: 0  •  [employer] FIBER GUMMIES PO, Take by mouth, Disp: , Rfl:   •  Multiple Vitamins-Minerals (Centrum Silver 50+Women) TABS, , Disp: , Rfl:   Allergies   Allergen Reactions   • Meperidine    • Pistachio Nut (Diagnostic) - Food Allergy Hives       OBJECTIVE    Vitals:   Vitals:    12/07/23 0855   BP: 122/82   Pulse: 86   Temp: (!) 97 °F (36.1 °C)   SpO2: 98%   Weight: 73.9 kg (163 lb)   Height: 5' 3" (1.6 m)           Physical Exam  Vitals and nursing note reviewed. Constitutional:       General: She is not in acute distress. Appearance: Normal appearance. HENT:      Head: Normocephalic and atraumatic. Pulmonary:      Effort: Pulmonary effort is normal. No respiratory distress. Musculoskeletal:      Comments: Unable to fully extend L fingers with active ROM, though can fully reach with passive force   Has to "unlock" hand to make fist    Neurological:      Mental Status: She is alert.       Comments: B/L hand tremor more prominent with intention, but intermittently present at rest   Shuffling gait, most notable when trying to turn    Psychiatric:         Mood and Affect: Mood normal.                    Shar Broderick DO  [facility]   12/7/2023  9:31 AM

## 2023-12-08 ENCOUNTER — PATIENT MESSAGE (OUTPATIENT)
Dept: FAMILY MEDICINE CLINIC | Facility: CLINIC | Age: 68
End: 2023-12-08

## 2023-12-08 LAB
TSH SERPL DL<=0.05 MIU/L-ACNC: 1.82 UIU/ML (ref 0.45–4.5)
VIT B12 SERPL-MCNC: 529 PG/ML (ref 180–914)

## 2023-12-13 ENCOUNTER — APPOINTMENT (OUTPATIENT)
Dept: LAB | Facility: CLINIC | Age: 68
End: 2023-12-13
Payer: MEDICARE

## 2023-12-13 ENCOUNTER — CONSULT (OUTPATIENT)
Dept: NEUROLOGY | Facility: CLINIC | Age: 68
End: 2023-12-13
Payer: MEDICARE

## 2023-12-13 VITALS
WEIGHT: 162 LBS | SYSTOLIC BLOOD PRESSURE: 142 MMHG | BODY MASS INDEX: 28.7 KG/M2 | HEART RATE: 80 BPM | OXYGEN SATURATION: 98 % | DIASTOLIC BLOOD PRESSURE: 92 MMHG | HEIGHT: 63 IN

## 2023-12-13 DIAGNOSIS — G20.A1 PARKINSON'S DISEASE WITHOUT DYSKINESIA, UNSPECIFIED WHETHER MANIFESTATIONS FLUCTUATE: Primary | ICD-10-CM

## 2023-12-13 DIAGNOSIS — G20.A1 PARKINSON'S DISEASE WITHOUT DYSKINESIA, UNSPECIFIED WHETHER MANIFESTATIONS FLUCTUATE: ICD-10-CM

## 2023-12-13 DIAGNOSIS — R25.1 TREMOR: ICD-10-CM

## 2023-12-13 LAB
B BURGDOR IGG+IGM SER QL IA: NEGATIVE
FOLATE SERPL-MCNC: >22.3 NG/ML
VIT B12 SERPL-MCNC: 580 PG/ML (ref 180–914)

## 2023-12-13 PROCEDURE — 86618 LYME DISEASE ANTIBODY: CPT

## 2023-12-13 PROCEDURE — 82746 ASSAY OF FOLIC ACID SERUM: CPT

## 2023-12-13 PROCEDURE — 82607 VITAMIN B-12: CPT

## 2023-12-13 PROCEDURE — 36415 COLL VENOUS BLD VENIPUNCTURE: CPT

## 2023-12-13 PROCEDURE — 99204 OFFICE O/P NEW MOD 45 MIN: CPT | Performed by: PSYCHIATRY & NEUROLOGY

## 2023-12-13 NOTE — PROGRESS NOTES
Temo Marques is a 76 y.o. female. Chief Complaint   Patient presents with    Tremors    Gait Problem       Assessment:  1. Parkinson's disease without dyskinesia, unspecified whether manifestations fluctuate    2. Tremor         Plan:  MRI of the brain. Blood work. Sinemet 25/100 mg 3 times a day. Refer to physical therapy. Patient was given a prescription for a walker. To take fall and safety precautions. Follow-up in 3 months    Discussion:    Differential diagnosis discussed with the patient, patient has Parkinson's disease with bradykinesia resting tremor cogwheeling rigidity decreased arm swing and stooped posture, would recommend an MRI scan of the brain and blood work to evaluate for her symptoms, patient to call me after the test to discuss the results, we did discuss different medication options she is agreeable to go on Sinemet 25/100 mg 1 tablet 3 times a day, side effects discussed with the patient, including dizziness and GI and other side effects, she was advised not to get up for 20 minutes after taking the medication also was advised to go for physical therapy, if she has any side effects to the medication to call me and stop the medication. she was advised to eat a healthy nutritious diet to go for physical therapy and be physically and mentally active as tolerated, to keep yourself well-hydrated.   To keep her blood pressure cholesterol and sugar under control, she was given a prescription for a walker and was advised to take fall and safety precautions ,to go to the hospital if has any worsening symptoms and call me otherwise to see me back in 3 months or sooner if needed and follow-up with the other physicians  Subjective:    HPI   Patient is here accompanied with her partner for evaluation of freezing episodes tremor and shuffling gait for the last 1 year, her resting tremor is mostly confined to the left hand more than the right hand she also has freezing episodes and has to take time to turn around she denies any difficulty in swallowing she does complain of early morning stiffness there is no headaches no vision or speech difficulty no bowel and bladder incontinence she has some low back issues for which she is going for physical therapy and follows up with the family physician, she has not had any falls but she does have some issues with her balance, her sleep is slightly disturbed but no dreams her memory is good she does not drive, her appetite is good weight has been stable, no family history of Parkinson's disease no other complaints. Vitals:    12/13/23 0925   BP: 142/92   BP Location: Left arm   Patient Position: Sitting   Cuff Size: Standard   Pulse: 80   SpO2: 98%   Height: 5' 3" (1.6 m)       Current Medications    Current Outpatient Medications:     Cholecalciferol 100 MCG (4000 UT) CAPS, Take 4,000 mg by mouth daily, Disp: , Rfl:     [employer] FIBER GUMMIES PO, Take by mouth, Disp: , Rfl:     Cyanocobalamin (B-12 PO), Take by mouth in the morning, Disp: , Rfl:     Multiple Vitamins-Minerals (Centrum Silver 50+Women) TABS, , Disp: , Rfl:     clotrimazole-betamethasone (LOTRISONE) 1-0.05 % cream, Apply topically 2 (two) times a day, Disp: 30 g, Rfl: 0      Allergies  Meperidine and Pistachio nut (diagnostic) - food allergy    Past Medical History  History reviewed. No pertinent past medical history. Past Surgical History:  Past Surgical History:   Procedure Laterality Date    TONSILLECTOMY      TUBAL LIGATION           Family History:  Family History   Problem Relation Age of Onset    Breast cancer Mother 36    Dementia Father     No Known Problems Sister     No Known Problems Sister     No Known Problems Sister     No Known Problems Maternal Grandmother     Prostate cancer Maternal Grandfather     No Known Problems Paternal Grandmother     Cancer Maternal Aunt        Social History:   reports that she has never smoked.  She has never used smokeless tobacco. She reports that she does not currently use alcohol. She reports that she does not currently use drugs. I have reviewed the past medical history, surgical history, social and family history, current medications, allergies vitals, review of systems, and updated this information as appropriate today. Objective:    Physical Exam    Neurological Exam     GENERAL:  Cooperative in no acute distress. Well-developed and well-nourished     HEAD and NECK   Head is atraumatic normocephalic with no lesions or masses. Neck is supple with full range of motion     CARDIOVASCULAR  Carotid Arteries-no carotid bruits. NEUROLOGIC:  Mental Status-the patient is awake alert and oriented without aphasia or apraxia  Cranial Nerves: Visual fields are full to confrontation. Extraocular movements are full without nystagmus. Pupils are 2-1/2 mm and reactive. Face is symmetrical to light touch. Movements of facial expression move symmetrically. Hearing is normal to finger rub bilaterally. Soft palate lifts symmetrically. Shoulder shrug is symmetrical. Tongue is midline without atrophy. Patient does have parkinsonian facial features  Motor: No drift is noted on arm extension. Strength is full in the upper and lower extremities with normal bulk and cogwheeling rigidity of both upper extremities left worse than the right  Sensory: Intact to temperature and vibratory sensation in the upper and lower extremities bilaterally. Cortical function is intact. Coordination: Finger to nose testing is performed accurately. Romberg is swaying, ambulates with a stooped posture and decreased arm swing and takes 3 turns to turn and has a shuffling gait  Reflexes:   1+ and symmetrical    toes are downgoing    ROS:  Review of Systems   Constitutional:  Negative for appetite change, fatigue and fever. HENT: Negative. Negative for hearing loss, tinnitus, trouble swallowing and voice change. Eyes: Negative. Negative for photophobia, pain and visual disturbance. Respiratory: Negative. Negative for shortness of breath. Cardiovascular: Negative. Negative for palpitations. Gastrointestinal: Negative. Negative for nausea and vomiting. Endocrine: Negative. Negative for cold intolerance. Genitourinary: Negative. Negative for dysuria, frequency and urgency. Musculoskeletal:  Positive for gait problem. Negative for back pain, myalgias and neck pain. Skin: Negative. Negative for rash. Allergic/Immunologic: Negative. Neurological:  Positive for tremors. Negative for dizziness, seizures, syncope, facial asymmetry, speech difficulty, weakness, light-headedness, numbness and headaches. Hematological: Negative. Does not bruise/bleed easily. Psychiatric/Behavioral: Negative. Negative for confusion, hallucinations and sleep disturbance.

## 2023-12-18 NOTE — PROGRESS NOTES
PT Evaluation     Today's date: 2023  Patient name: Priya Florez  : 1955  MRN: [id number]  Referring provider: Lauri Chin MD  Dx:   Encounter Diagnosis     ICD-10-CM    1. Parkinson's disease without dyskinesia, unspecified whether manifestations fluctuate  G20.A1           Start Time: 1200  Stop Time: 1253  Total time in clinic (min): 53 minutes    Assessment  Assessment details: Priya Florez is a 68 y.o. female who presents with pain, decreased strength, decreased ROM, ambulatory dysfunction, and balance dysfunction. Due to these impairments, Patient has difficulty performing a/iadls, recreational activities, and engaging in social activities. Patient's clinical presentation is consistent with their referring diagnosis of PD. Patient would benefit from a trial of skilled physical therapy to address their aforementioned impairments, improve their level of function and to improve their overall quality of life.  Impairments: abnormal gait, abnormal or restricted ROM, activity intolerance, impaired balance, impaired physical strength, lacks appropriate home exercise program and poor posture   Understanding of Dx/Px/POC: good   Prognosis: good    Goals  ST-4 WEEKS  1.  Increase postural awareness  2.  Improve awareness of gait dynamics with increased step length  3.  Increase LE strength by 1/2 MMT grade in all deficient planes.  4. Increase UE elevation to 130 or greater  LT-8 WEEKS  1. Patient to participate in meal preparation  2. Increase walking tolerance to 10 minutes  3. Independent with HEP and/or fitness program.  4. Improve TUG 5 seconds  5. Decrease fall risk  6. Maximize safety awareness    Plan  Patient would benefit from: skilled physical therapy  Planned modality interventions: unattended electrical stimulation  Planned therapy interventions: activity modification, behavior modification, body mechanics training, aquatic therapy, flexibility, functional ROM exercises,  home exercise program, IADL retraining, joint mobilization, manual therapy, neuromuscular re-education, patient education, postural training, strengthening, stretching, therapeutic activities and therapeutic exercise  Frequency: 2x per week.  Duration in weeks: 8  Plan of Care beginning date: 12/19/2023  Plan of Care expiration date: 3/18/2024  Treatment plan discussed with: patient      Subjective Evaluation    History of Present Illness  Mechanism of injury: Notes her primary concern is the freezing episodes.  Notes symptoms began over the past year and progressively worse over the past two months.     Notes up until 8 weeks ago was walking 5/8 miles per day. Notes it is exhausting now to walk. Notes walking tolerance less than 5 minutes. No longer grocery shopping.  Notes low back pain with standing and walking.   Denies falls. No assistive device. Dependent in meal prep. Sometimes requires assistance with sit to stand transfer.  Notes sometimes needs help with dressing. Independent with showering.     Notes reduced function in left hand. Trigger finger 2020. Last two years lost mobility in hand. 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 Had to stop due to hand mobility loss and weakness. Using adaptive equipment to open cans. Left hand dominant.   Quality of life: good    Patient Goals  Patient goals for therapy: increased strength and increased motion  Patient goal: improve freezing gait;  Pain  Location: low back pain  Quality: tight and dull ache  Relieving factors: rest and change in position  Aggravating factors: walking and standing    Social Support  Stairs in house: yes (13 with landing)   Lives with: significant other    Hand dominance: left          Objective     Static Posture     Head  Forward.    Shoulders  Rounded.    Thoracic Spine  Hyperkyphosis.    Strength/Myotome Testing     Left Shoulder     Planes of Motion   Flexion: 4-   Abduction: 3+     Right Shoulder     Planes of Motion    Flexion: 4-   Abduction: 4-     Left Elbow   Flexion: WFL  Extension: WFL    Right Elbow   Flexion: WFL  Extension: WFL    Left Wrist/Hand   Wrist extension: WFL  Wrist flexion: WFL    Right Wrist/Hand   Wrist extension: WFL  Wrist flexion: WFL    Left Hip   Planes of Motion   Flexion: 4-  Abduction: 3+    Right Hip   Planes of Motion   Flexion: 4  Abduction: 4-    Left Knee   Flexion: 4-  Extension: 4-    Right Knee   Flexion: 4  Extension: 4    Left Ankle/Foot   Dorsiflexion: WFL.     Right Ankle/Foot   Dorsiflexion: WFL.     Ambulation     Ambulation: Level Surfaces   Ambulation without assistive device: independent    Ambulation: Stairs   Ascend stairs: independent  Pattern: reciprocal  Railings: one rail  Descend stairs: independent  Pattern: reciprocal  Railings: one rail    Observational Gait   Decreased walking speed and stride length.     Quality of Movement During Gait   Trunk  Forward lean.   Neuro Exam:     Sensation   Light touch LE: left WNL and right WNL    Coordination   Finger to nose: left WNL and right WNL    Transfers   Sit to stand: independent (slow)     Functional outcomes   TU (seconds)  Functional outcome comment: Sit to stand test x 5 24 seconds  Functional outcome gait comment: Shuffling gait without heel strike; reduced step length and gait speed; some acceleration on straightaway; multiple steps to negotiate turn or direction change.                 POC expires: 3/17/24  Date             Visit count 1            FOTO No FOTO                  Precautions: FALL RISK; FATIGUES EASILY      Manuals                                                                 Neuro Re-Ed             Side stepping over pods 3 laps            Step over pods 3 laps            Fig 8 around 2 pods 5 times                                                                Ther Ex             NuStep             UBE                          Seated wand elevation/FF 10x            Seated wand chest  press 10x                                                   Ther Activity             Sit to stand                          Gait Training                                       Modalities

## 2023-12-19 ENCOUNTER — EVALUATION (OUTPATIENT)
Dept: PHYSICAL THERAPY | Age: 68
End: 2023-12-19
Payer: MEDICARE

## 2023-12-19 DIAGNOSIS — G20.A1 PARKINSON'S DISEASE WITHOUT DYSKINESIA, UNSPECIFIED WHETHER MANIFESTATIONS FLUCTUATE: Primary | ICD-10-CM

## 2023-12-19 PROCEDURE — 97162 PT EVAL MOD COMPLEX 30 MIN: CPT | Performed by: PHYSICAL THERAPIST

## 2023-12-19 PROCEDURE — 97112 NEUROMUSCULAR REEDUCATION: CPT | Performed by: PHYSICAL THERAPIST

## 2023-12-19 PROCEDURE — 97110 THERAPEUTIC EXERCISES: CPT | Performed by: PHYSICAL THERAPIST

## 2023-12-20 ENCOUNTER — OFFICE VISIT (OUTPATIENT)
Dept: PHYSICAL THERAPY | Age: 68
End: 2023-12-20
Payer: MEDICARE

## 2023-12-20 DIAGNOSIS — R35.0 URINARY FREQUENCY: Primary | ICD-10-CM

## 2023-12-20 PROCEDURE — 97110 THERAPEUTIC EXERCISES: CPT | Performed by: PHYSICAL THERAPIST

## 2023-12-20 NOTE — PROGRESS NOTES
"Daily Note     Today's date: 2023  Patient name: Priya Florez  : 1955  MRN: [id number]  Referring provider: Chetna Catalan DO  Dx:   Encounter Diagnosis     ICD-10-CM    1. Urinary frequency  R35.0                      Subjective: No new complaints. Some fatigue. Notes she has been using urge delay/suppression with some success. Nocturia x 2 last night. Slept for 5 hours with no urgency upon awaking.      Objective: See treatment diary below      Assessment: Tolerated treatment well. Patient demonstrated fatigue post treatment and would benefit from continued PT HEP instruction this date. Verbalized and demonstrated understanding. Written handouts provided. Diaphragmatic breathing and PFM awareness exercise challenging. Patient Deferred direct PFM examination. Feels she is activating Pfm with exercise. Improved breath sequencing with practice.       Plan: Continue per plan of care.        POC expires: 3/1/24  Date            Visit count 1 2           OSMAR 36                 Precautions: gait dysfunction, fall risk      Manuals                                                                Neuro Re-Ed                                       Ther Ex             LE stretch  nv           Diaphragmatic breathing  3'  HEP           PFM relaxation/awareness with breath  3'  HEP           CRK  3\"/5\"  10x  HEP           Ball add with PFMC/count  3\"/10x           TB Er with exhale   GTB  10x  HEP           TB ER with Kegel  GTB  10x  HEP           Long leg IR/ER  10x  HEP                        Ther Activity             PFM education PP            Bladder health education PP with ROSSY gomez PP                         Gait Training                                       Modalities                                            "

## 2023-12-22 ENCOUNTER — OFFICE VISIT (OUTPATIENT)
Dept: PHYSICAL THERAPY | Age: 68
End: 2023-12-22
Payer: MEDICARE

## 2023-12-22 DIAGNOSIS — G20.A1 PARKINSON'S DISEASE WITHOUT DYSKINESIA, UNSPECIFIED WHETHER MANIFESTATIONS FLUCTUATE: Primary | ICD-10-CM

## 2023-12-22 PROCEDURE — 97112 NEUROMUSCULAR REEDUCATION: CPT

## 2023-12-22 PROCEDURE — 97110 THERAPEUTIC EXERCISES: CPT

## 2023-12-22 NOTE — PROGRESS NOTES
Daily Note     Today's date: 2023  Patient name: Priya Florez  : 1955  MRN: [id number]  Referring provider: Chetna Catalan DO  Dx:   Encounter Diagnosis     ICD-10-CM    1. Parkinson's disease without dyskinesia, unspecified whether manifestations fluctuate  G20.A1           Start Time: 1115  Stop Time: 1155  Total time in clinic (min): 40 minutes    Subjective: Reports difficulty turning and that her LLE freezes on her when she is walking.       Objective: See treatment diary below      Assessment: Patient self doubts when given new tasks, but is able to complete with some encouragement. Emphasis on exaggerated LE and UE movements today as well as directional changes during ambulation. Patient did well with rest breaks needed between activities. CS to CGA for safety for all exercises.  Patient would benefit from continued PT      Plan: Continue per plan of care.          POC expires: 3/17/24  Date            Visit count 1 2           FOTO No FOTO              Precautions: gait dysfunction, fall risk    Manuals                                                                Neuro Re-Ed             Side stepping over pods 3 laps Hurdles 3 laps            Step over pods 3 laps Hurdles 3 laps            Fig 8 around 2 pods 5 times Around cones 5x            FW amb with high march  80 ft            Serpentine around cones   3 laps                                      Ther Ex             NuStep  L4 6'            UBE                          Seated wand elevation/FF 10x 15x            Seated wand chest press 10x 15x                                                   Ther Activity             Sit to stand large ROM w/ arms  10x           Directional change between pods  NV           Sit to stand between 2 chairs (emphasis on turning)  NV                        Gait Training                                       Modalities

## 2023-12-26 ENCOUNTER — OFFICE VISIT (OUTPATIENT)
Dept: PHYSICAL THERAPY | Age: 68
End: 2023-12-26
Payer: MEDICARE

## 2023-12-26 DIAGNOSIS — G20.A1 PARKINSON'S DISEASE WITHOUT DYSKINESIA, UNSPECIFIED WHETHER MANIFESTATIONS FLUCTUATE: Primary | ICD-10-CM

## 2023-12-26 PROCEDURE — 97110 THERAPEUTIC EXERCISES: CPT

## 2023-12-26 PROCEDURE — 97112 NEUROMUSCULAR REEDUCATION: CPT

## 2023-12-26 PROCEDURE — 97530 THERAPEUTIC ACTIVITIES: CPT

## 2023-12-26 NOTE — PROGRESS NOTES
Daily Note     Today's date: 2023  Patient name: Priya Florez  : 1955  MRN: [id number]  Referring provider: Chetna Catalan DO  Dx:   Encounter Diagnosis     ICD-10-CM    1. Parkinson's disease without dyskinesia, unspecified whether manifestations fluctuate  G20.A1                      Subjective: Shelby reports minimal soreness after LV.       Objective: See treatment diary below      Assessment: Tolerated treatment well. Change of direction improved with repetitions during STS between chairs, R and L turns with ease. Less of a shuffling gait noted after high marching. Apprehension with side stepping during marta management, less without obstacles. Lacks reciprocal arm swing with ambulation, fair carryover for exaggerated motion to normalize mechanics. Continued PT would be beneficial to improve function.           Plan: Continue per plan of care.       POC expires: 3/17/24  Date           Visit count 1 2 3          FOTO No FOTO              Precautions: gait dysfunction, fall risk    Manuals                                                               Neuro Re-Ed             Side stepping over pods 3 laps Hurdles 3 laps  5 Hurdles 4 laps           Step over pods 3 laps Hurdles 3 laps  5 hurdles 4 laps          Fig 8 around 2 pods 5 times Around cones 5x  Around cones 5x           FW amb with high march  80 ft  80 ft          Serpentine around cones   3 laps  3 laps                                    Ther Ex             NuStep  L4 6'  L4 8'           UBE                          Seated wand elevation/FF 10x 15x  2x10          Seated wand chest press 10x 15x  2x10                                                  Ther Activity             Sit to stand large ROM w/ arms  10x 10x          Directional change between pods  NV Square 2 laps R/L, FW/SS/BW          Sit to stand between 2 chairs (emphasis on turning)  NV 10 ft, 10x                       Gait Training                                        Modalities

## 2023-12-27 NOTE — PROGRESS NOTES
"Daily Note     Today's date: 2023  Patient name: Priya Florez  : 1955  MRN: [id number]  Referring provider: Chetna Catalan DO  Dx:   Encounter Diagnosis     ICD-10-CM    1. Urinary frequency  R35.0           Start Time: 1100  Stop Time: 1145  Total time in clinic (min): 45 minutes    Subjective:  Patient notes reduced urinary frequency during the night. Denies leakage. Notes moving more quickly since starting new medication. Compliant with HEP.      Objective: See treatment diary below      Assessment: Tolerated treatment well.  Reviewed exercises. Some challenge with breath sequencing but aware of breath holding and can correct. Patient demonstrated fatigue post treatment Patient independent in ongoing HEP.  Success with urge suppression techniques. Patient requests discharge from PFPT to HEP as she would like to concentrate on PD PT. PT goals partially met.       Plan: Discharge PT         POC expires: 3/1/24  Date           Visit count 1 2 3          OSMAR 36                 Precautions: gait dysfunction, fall risk      Manuals                                                               Neuro Re-Ed                                       Ther Ex             LE stretch  nv 10'          Diaphragmatic breathing  3'  HEP 3          PFM relaxation/awareness with breath  3'  HEP 3          CRK  3\"/5\"  10x  HEP 3\"/5\"/  10x          Ball add with PFMC/count  3\"/10x 2x10          TB Er with exhale   GTB  10x  HEP 10x          TB ER with Kegel  GTB  10x  HEP 10x          Long leg IR/ER  10x  HEP 2x10                       Ther Activity             PFM education PP            Bladder health education PP with HO  review          Sury gomez PP                         Gait Training                                       Modalities                                       12/  POC expires: 3/17/24    Precautions: gait dysfunction, fall risk           "

## 2023-12-28 ENCOUNTER — OFFICE VISIT (OUTPATIENT)
Dept: PHYSICAL THERAPY | Age: 68
End: 2023-12-28
Payer: MEDICARE

## 2023-12-28 DIAGNOSIS — R35.0 URINARY FREQUENCY: Primary | ICD-10-CM

## 2023-12-28 PROCEDURE — 97110 THERAPEUTIC EXERCISES: CPT | Performed by: PHYSICAL THERAPIST

## 2023-12-28 NOTE — PROGRESS NOTES
Daily Note     Today's date: 2023  Patient name: Priya Florez  : 1955  MRN: [id number]  Referring provider: Chetna Catalan DO  Dx:   Encounter Diagnosis     ICD-10-CM    1. Parkinson's disease without dyskinesia, unspecified whether manifestations fluctuate  G20.A1                      Subjective: Patient notes some fatigue today as she showered prior to coming to PT when generally she would do that the night before. Notes no back pain. Also notes nocturia only times one last night.       Objective: See treatment diary below      Assessment: Tolerated treatment well. Patient would benefit from continued PT Fatigued post treatment. Little UE movement with walking. Improved turns with less steps required with direction changes. Weakness L LE noted as compared to r LE in balance activities.       Plan: Continue per plan of care.      POC expires: 3/17/24  Date          Visit count 1 2 3 4         FOTO No FOTO              Precautions: gait dysfunction, fall risk, watch fatigue    Manuals                                                              Neuro Re-Ed             Side stepping over pods 3 laps Hurdles 3 laps  5 Hurdles 4 laps  4x         Step over pods 3 laps Hurdles 3 laps  5 hurdles 4 laps 4x         Fig 8 around 2 pods 5 times Around cones 5x  Around cones 5x           FW amb with high march  80 ft  80 ft 40ft x 2         Serpentine around cones   3 laps  3 laps 3x                                   Ther Ex             NuStep  L4 6'  L4 8'  L4  6'         UBE                          Seated wand elevation/FF 10x 15x  2x10 2x10         Seated wand chest press 10x 15x  2x10  2x10         pulleys    10x                                   Ther Activity             Sit to stand large ROM w/ arms  10x 10x 10x         Directional change between pods  NV Square 2 laps R/L, FW/SS/BW 3x         Sit to stand between 2 chairs (emphasis on turning)  NV 10  ft, 10x 6x                      Gait Training                                       Modalities

## 2023-12-29 ENCOUNTER — TELEPHONE (OUTPATIENT)
Dept: NEUROLOGY | Facility: CLINIC | Age: 68
End: 2023-12-29

## 2023-12-29 ENCOUNTER — OFFICE VISIT (OUTPATIENT)
Dept: PHYSICAL THERAPY | Age: 68
End: 2023-12-29
Payer: MEDICARE

## 2023-12-29 DIAGNOSIS — G20.A1 PARKINSON'S DISEASE WITHOUT DYSKINESIA, UNSPECIFIED WHETHER MANIFESTATIONS FLUCTUATE: Primary | ICD-10-CM

## 2023-12-29 PROCEDURE — 97110 THERAPEUTIC EXERCISES: CPT | Performed by: PHYSICAL THERAPIST

## 2023-12-29 PROCEDURE — 97112 NEUROMUSCULAR REEDUCATION: CPT | Performed by: PHYSICAL THERAPIST

## 2023-12-29 NOTE — TELEPHONE ENCOUNTER
LVM for patient to call me back. Patient has pending MRI order and is not scheduled for it. Patient would need this done before follow up appointment, unless requesting to be seen urgently without it.

## 2023-12-29 NOTE — TELEPHONE ENCOUNTER
Patient called me back, rescheduled the July appointment to March 12 @ 3PM. She will schedule the MRI in January. PT has been helpful.

## 2024-01-03 ENCOUNTER — APPOINTMENT (OUTPATIENT)
Dept: PHYSICAL THERAPY | Age: 69
End: 2024-01-03
Payer: MEDICARE

## 2024-01-03 ENCOUNTER — TELEPHONE (OUTPATIENT)
Dept: NEUROLOGY | Facility: CLINIC | Age: 69
End: 2024-01-03

## 2024-01-03 NOTE — TELEPHONE ENCOUNTER
received vm from 1/2 at 1:41pm-This is Pat alejandra calling. I need an authorization for [facility] for my MRI. there's an MRN number. I don't know if that makes anything [id number]. My date of birth is March 21st. 1955, and it's Dr. Chin and it's an MRI brain without contrast. If you could give me a call back, I can give you the numbers  they require. I'd appreciate it. My number is [phone]. Thank you.

## 2024-01-05 ENCOUNTER — OFFICE VISIT (OUTPATIENT)
Dept: PHYSICAL THERAPY | Age: 69
End: 2024-01-05
Payer: MEDICARE

## 2024-01-05 DIAGNOSIS — R35.0 URINARY FREQUENCY: ICD-10-CM

## 2024-01-05 DIAGNOSIS — G20.A1 PARKINSON'S DISEASE WITHOUT DYSKINESIA, UNSPECIFIED WHETHER MANIFESTATIONS FLUCTUATE: Primary | ICD-10-CM

## 2024-01-05 PROCEDURE — 97530 THERAPEUTIC ACTIVITIES: CPT

## 2024-01-05 PROCEDURE — 97110 THERAPEUTIC EXERCISES: CPT

## 2024-01-05 PROCEDURE — 97112 NEUROMUSCULAR REEDUCATION: CPT

## 2024-01-05 NOTE — PROGRESS NOTES
Daily Note     Today's date: 2024  Patient name: Priya Florez  : 1955  MRN: [id number]  Referring provider: Chetna Catalan DO  Dx:   Encounter Diagnosis     ICD-10-CM    1. Parkinson's disease without dyskinesia, unspecified whether manifestations fluctuate  G20.A1       2. Urinary frequency  R35.0           Start Time: 1115  Stop Time: 1200  Total time in clinic (min): 45 minutes    Subjective: Reports no new concerns.       Objective: See treatment diary below      Assessment: CS to CGA during session for safety. No LOB during obstacle work. Added warrior II to encourage increased hip ROM and upper body rotation. Some cues during ambulation with high marches to look up and to maintain increased hip ROM. Rest breaks needed between exercises. Cues during ambulation for heelstrike and greater step length. Patient would benefit from continued PT      Plan: Continue per plan of care.      POC expires: 3/17/24  Date         Visit count 1 2 3 4 5        FOTO No FOTO              Precautions: gait dysfunction, fall risk, watch fatigue    Manuals                                                             Neuro Re-Ed             Side stepping over pods 3 laps Hurdles 3 laps  5 Hurdles 4 laps  4x 4 laps        Step over pods 3 laps Hurdles 3 laps  5 hurdles 4 laps 4x 4 laps        Fig 8 around 2 pods 5 times Around cones 5x  Around cones 5x   Cones 5x         FW amb with high march  80 ft  80 ft 40ft x 2 40 ft 2x         Serpentine around cones   3 laps  3 laps 3x 4x                                   Ther Ex             NuStep  L4 6'  L4 8'  L4  6' L5 8'         UBE                          Seated wand elevation/FF 10x 15x  2x10 2x10 2x10        Seated wand chest press 10x 15x  2x10  2x10 2x10        pulleys    10x 20x         Warrior II     5x ea side                      Ther Activity             Sit to stand large ROM w/ arms  10x 10x 10x 10x         Directional change between pods  NV Square 2 laps R/L, FW/SS/BW 3x 10x         Sit to stand between 2 chairs (emphasis on turning)  NV 10 ft, 10x 6x 6x                      Gait Training             Amb around gym focus on stride length     1 lap                     Modalities

## 2024-01-08 ENCOUNTER — OFFICE VISIT (OUTPATIENT)
Dept: PHYSICAL THERAPY | Age: 69
End: 2024-01-08
Payer: MEDICARE

## 2024-01-08 DIAGNOSIS — G20.A1 PARKINSON'S DISEASE WITHOUT DYSKINESIA, UNSPECIFIED WHETHER MANIFESTATIONS FLUCTUATE: Primary | ICD-10-CM

## 2024-01-08 PROCEDURE — 97112 NEUROMUSCULAR REEDUCATION: CPT

## 2024-01-08 PROCEDURE — 97110 THERAPEUTIC EXERCISES: CPT

## 2024-01-08 PROCEDURE — 97530 THERAPEUTIC ACTIVITIES: CPT

## 2024-01-08 NOTE — PROGRESS NOTES
Daily Note     Today's date: 2024  Patient name: Priya Florez  : 1955  MRN: [id number]  Referring provider: Chetna Catalan DO  Dx:   Encounter Diagnosis     ICD-10-CM    1. Parkinson's disease without dyskinesia, unspecified whether manifestations fluctuate  G20.A1           Start Time: 1110  Stop Time: 1158  Total time in clinic (min): 48 minutes    Subjective: Reports being tired upon arrival.       Objective: See treatment diary below      Assessment: Emphasis on reciprocal UE and LE movement during amb activities. Also cues for longer stride length, especially LLE. Loses sequencing with interruptions in gait due to obstacles and with directional changes. Increased laps and distance for various activities noted below and added UBE this session with good tolerance, just some fatigue post session. Rest breaks t/o session. Patient would benefit from continued PT      Plan: Continue per plan of care.      POC expires: 3/17/24  Date        Visit count 1 2 3 4 5 6       FOTO No FOTO              Precautions: gait dysfunction, fall risk, watch fatigue    Manuals                                                            Neuro Re-Ed             Side stepping over pods 3 laps Hurdles 3 laps  5 Hurdles 4 laps  4x 4 laps 4 laps       Step over pods 3 laps Hurdles 3 laps  5 hurdles 4 laps 4x 4 laps 4 laps        Fig 8 around 2 pods 5 times Around cones 5x  Around cones 5x   Cones 5x  Cones 5x        FW amb with high march  80 ft  80 ft 40ft x  40 ft 2x  80 ft 1x        Serpentine around cones   3 laps  3 laps 3x 4x  5 laps                                  Ther Ex             NuStep  L4 6'  L4 8'  L4  6' L5 8'  L5 8'        UBE      3' FW                    Seated wand elevation/FF 10x 15x  2x10 2x10 2x10 2x10       Seated wand chest press 10x 15x  2x10  2x10 2x10 2x10       pulleys    10x 20x  20x       Warrior II     5x ea side  8x ea side                     Ther Activity             Sit to stand large ROM w/ arms  10x 10x 10x 10x 10x        Directional change between pods  NV Square 2 laps R/L, FW/SS/BW 3x 10x  10x        Sit to stand between 2 chairs (emphasis on turning)  NV 10 ft, 10x 6x 6x  10x                     Gait Training             Amb around gym focus on stride length     1 lap 80 ft x2                    Modalities

## 2024-01-10 ENCOUNTER — APPOINTMENT (OUTPATIENT)
Dept: PHYSICAL THERAPY | Age: 69
End: 2024-01-10
Payer: MEDICARE

## 2024-01-10 NOTE — TELEPHONE ENCOUNTER
Clare from [facility] called and is requesting authorization for patient's MRI.  She is calling patient to schedule MRI.  Please call [facility] with info at  [phone]  Thank you!

## 2024-01-10 NOTE — TELEPHONE ENCOUNTER
Received VM transcription from 1/9/24, 11:26 AM:    This is Shelby toussaint. Date of birth, March 21, 1955. I'm looking to have an order for an MRI sent to [facility]. They said they didn't receive it yet. And I'm trying to schedule an appointment. If you could call me back, we could figure out what needs to be done. Thank you.  --------------------------    Per referral notes:    Referral Notes  Number of Notes: 3  .  Type Date User Summary Attachment   Prior Auth Follow Up 01/08/2024  9:46 AM Mikala Carmendilip Called [facility] [phone] back and spoke with Katie. She said she did not receive my message and asked for the patient's insurance information. I gave her the insurance info for  and let her know that there is no prior authorization re -   Note:  Called [facility] [phone] back and spoke with Katie. She said she did not receive my message and asked for the patient's insurance information. I gave her the insurance info for  and let her know that there is no prior authorization required for Medicare A&B or [employer] [facility]. N/R She asked me to fax the MRI Brain order to [phone] and said that she will have them call the patient to schedule her MRI Brain. Faxed via [employer].  ------------------------------    Please contact pt with update. Thank you!

## 2024-01-12 ENCOUNTER — OFFICE VISIT (OUTPATIENT)
Dept: PHYSICAL THERAPY | Age: 69
End: 2024-01-12
Payer: MEDICARE

## 2024-01-12 DIAGNOSIS — G20.A1 PARKINSON'S DISEASE WITHOUT DYSKINESIA, UNSPECIFIED WHETHER MANIFESTATIONS FLUCTUATE: Primary | ICD-10-CM

## 2024-01-12 PROCEDURE — 97112 NEUROMUSCULAR REEDUCATION: CPT

## 2024-01-12 PROCEDURE — 97530 THERAPEUTIC ACTIVITIES: CPT

## 2024-01-12 PROCEDURE — 97110 THERAPEUTIC EXERCISES: CPT

## 2024-01-12 PROCEDURE — 97116 GAIT TRAINING THERAPY: CPT

## 2024-01-12 NOTE — TELEPHONE ENCOUNTER
1/10 at 2:36 pm:    Hi, this is Pat Zuleykay, calling. Date of birth, March 21st,1955. Just wanted to say thank you for getting touch with [facility]. Everything's set up for them. I appreciate all the help. Thanks, zachariah.

## 2024-01-12 NOTE — TELEPHONE ENCOUNTER
1/10 at 11:14 am:    This is Pat Cury calling again, date of birth, March 21st 1955. Phone number is [phone]. Trying to get a doctor's order for an MRI to be sent to [facility]. And they have all my information but the waiting for the doctors orders, so I could schedule the MRI. Please call me back. [phone].  ___________________________________________________________________________  Order for MRI was faxed on 1/10. Pt is requesting a return call with an update. Routed to clinical team to follow up.

## 2024-01-12 NOTE — PROGRESS NOTES
Daily Note     Today's date: 2024  Patient name: Priya Florez  : 1955  MRN: [id number]  Referring provider: Chetna Catalan DO  Dx:   Encounter Diagnosis     ICD-10-CM    1. Parkinson's disease without dyskinesia, unspecified whether manifestations fluctuate  G20.A1           Start Time: 1110  Stop Time: 1205  Total time in clinic (min): 55 minutes    Subjective: Reports no new concerns.       Objective: See treatment diary below      Assessment:  Cues to encourage UE movement during gait, added oop knee tap with high marches around PT gym. Some hesitation when nearing obstacles and during directional changes, but improving. Short rest breaks between activities. Patient would benefit from continued PT.      Plan: Continue per plan of care.      POC expires: 3/17/24  Date       Visit count 1 2 3 4 5 6 7      FOTO No FOTO              Precautions: gait dysfunction, fall risk, watch fatigue    Manuals                                                           Neuro Re-Ed             Side stepping over pods 3 laps Hurdles 3 laps  5 Hurdles 4 laps  4x 4 laps 4 laps 4 laps       Step over pods 3 laps Hurdles 3 laps  5 hurdles 4 laps 4x 4 laps 4 laps  4 laps       Fig 8 around 2 pods 5 times Around cones 5x  Around cones 5x   Cones 5x  Cones 5x  Cones 6x       FW amb with high march (recip UE)  80 ft  80 ft 40ft x 2 40 ft 2x  80 ft 1x  80'x1  40'x1  Opp knee tap      Serpentine around cones   3 laps  3 laps 3x 4x  5 laps  5 laps                                 Ther Ex             NuStep  L4 6'  L4 8'  L4  6' L5 8'  L5 8'  L5 8'       UBE      3' FW 3' FW                   Seated wand elevation/FF 10x 15x  2x10 2x10 2x10 2x10 20x      Seated wand chest press 10x 15x  2x10  2x10 2x10 2x10 20x       pulleys    10x 20x  20x NT      Warrior II     5x ea side  8x ea side 10x ea side                   Ther Activity             Sit to stand  large ROM w/ arms  10x 10x 10x 10x 10x  10x       Directional change between pods  NV Square 2 laps R/L, FW/SS/BW 3x 10x  10x  10x       Sit to stand between 2 chairs (emphasis on turning)  NV 10 ft, 10x 6x 6x  10x  10x                    Gait Training             Amb around gym focus on stride length and ricipr UE     1 lap 80 ft x2 100 ft x2                   Modalities

## 2024-01-12 NOTE — TELEPHONE ENCOUNTER
Notified patient no auth needed and order faxed. Patient states she is scheduled for Monday and good to go.

## 2024-01-15 ENCOUNTER — APPOINTMENT (OUTPATIENT)
Dept: PHYSICAL THERAPY | Age: 69
End: 2024-01-15
Payer: MEDICARE

## 2024-01-15 ENCOUNTER — PATIENT MESSAGE (OUTPATIENT)
Dept: NEUROLOGY | Facility: CLINIC | Age: 69
End: 2024-01-15

## 2024-01-17 ENCOUNTER — APPOINTMENT (OUTPATIENT)
Dept: PHYSICAL THERAPY | Age: 69
End: 2024-01-17
Payer: MEDICARE

## 2024-01-17 ENCOUNTER — OFFICE VISIT (OUTPATIENT)
Dept: PHYSICAL THERAPY | Age: 69
End: 2024-01-17
Payer: MEDICARE

## 2024-01-17 DIAGNOSIS — G20.A1 PARKINSON'S DISEASE WITHOUT DYSKINESIA, UNSPECIFIED WHETHER MANIFESTATIONS FLUCTUATE: Primary | ICD-10-CM

## 2024-01-17 DIAGNOSIS — G20.A1 PARKINSON'S DISEASE, UNSPECIFIED WHETHER DYSKINESIA PRESENT, UNSPECIFIED WHETHER MANIFESTATIONS FLUCTUATE: Primary | ICD-10-CM

## 2024-01-17 PROCEDURE — 97110 THERAPEUTIC EXERCISES: CPT

## 2024-01-17 PROCEDURE — 97112 NEUROMUSCULAR REEDUCATION: CPT

## 2024-01-17 PROCEDURE — 97530 THERAPEUTIC ACTIVITIES: CPT

## 2024-01-17 NOTE — PROGRESS NOTES
Daily Note     Today's date: 2024  Patient name: Priya Florez  : 1955  MRN: [id number]  Referring provider: Chetna Catalan DO  Dx:   Encounter Diagnosis     ICD-10-CM    1. Parkinson's disease without dyskinesia, unspecified whether manifestations fluctuate  G20.A1                      Subjective: Offers no concerns upon arrival. States she was unable to tolerate the close proximity during MRI, will have CT scan next week.       Objective: See treatment diary below      Assessment: Encouraged relaxation of upper limb movements to facilitate reciprocal pattern. Unsynchronized movements UE vs LE when marching. Challagned with side stepping to the R > than L side stepping. Shuffle steppage and hesitation gait when approaching objects or turns, vc for continue step length and maintain reciprocal motion. Pt offers no complaints post session.       Plan: Continue per plan of care.      POC expires: 3/17/24  Date      Visit count 1 2 3 4 5 6 7 8     FOTO No FOTO              Precautions: gait dysfunction, fall risk, watch fatigue    Manuals                                                          Neuro Re-Ed             Side stepping over pods 3 laps Hurdles 3 laps  5 Hurdles 4 laps  4x 4 laps 4 laps 4 laps  4 laps     Step over pods 3 laps Hurdles 3 laps  5 hurdles 4 laps 4x 4 laps 4 laps  4 laps  4 laps     Fig 8 around 2 pods 5 times Around cones 5x  Around cones 5x   Cones 5x  Cones 5x  Cones 6x  Cones 6x     FW amb with high march (recip UE)  80 ft  80 ft 40ft x 2 40 ft 2x  80 ft 1x  80'x1  40'x1  Opp knee tap 80' x1 40'x 1 opp knee tap     Serpentine around cones   3 laps  3 laps 3x 4x  5 laps  5 laps  5 laps                                Ther Ex             NuStep  L4 6'  L4 8'  L4  6' L5 8'  L5 8'  L5 8'  L5 8'     UBE      3' FW 3' FW 3 FW                  Seated wand elevation/FF 10x 15x  2x10 2x10 2x10 2x10 20x  20x     Seated wand chest press 10x 15x  2x10  2x10 2x10 2x10 20x  20x     pulleys    10x 20x  20x NT 20x     Warrior II     5x ea side  8x ea side 10x ea side 10x ea side                  Ther Activity             Sit to stand large ROM w/ arms  10x 10x 10x 10x 10x  10x  10x     Directional change between pods  NV Square 2 laps R/L, FW/SS/BW 3x 10x  10x  10x  Square      Sit to stand between 2 chairs (emphasis on turning)  NV 10 ft, 10x 6x 6x  10x  10x  10x                  Gait Training             Amb around gym focus on stride length and ricipr UE     1 lap 80 ft x2 100 ft x2 100 ft x 2                  Modalities

## 2024-01-17 NOTE — PATIENT COMMUNICATION
January 17, 2024  Lauri Chin MD   to Neurology [place] Clinical Team 1       1/17/24 11:09 AM  Please advise the patient if she is not able to do the MRI scan we will hold off on that I ordered a CAT scan of the brain

## 2024-01-17 NOTE — PATIENT COMMUNICATION
received vm from 1/15 at 3:25pm-This is Shelby roberts calling date of birth, march 21st 1955. I tried very hard to do an MRI today, and I couldn't do it. I totally freaked out. So we're gonna have to come up with something different. I'm not sure what else there is to do.  can you somehow knock me out completely. Like I said, I tried but nope, didn't work. So give me a call back, let me know what my options are. Thank you. At [phone].  ----------------------------------------  [employer] message sent to pt regarding dr tao's message

## 2024-01-19 ENCOUNTER — OFFICE VISIT (OUTPATIENT)
Dept: PHYSICAL THERAPY | Age: 69
End: 2024-01-19
Payer: MEDICARE

## 2024-01-19 DIAGNOSIS — G20.A1 PARKINSON'S DISEASE WITHOUT DYSKINESIA, UNSPECIFIED WHETHER MANIFESTATIONS FLUCTUATE: Primary | ICD-10-CM

## 2024-01-19 PROCEDURE — 97112 NEUROMUSCULAR REEDUCATION: CPT

## 2024-01-19 PROCEDURE — 97110 THERAPEUTIC EXERCISES: CPT

## 2024-01-19 PROCEDURE — 97530 THERAPEUTIC ACTIVITIES: CPT

## 2024-01-19 NOTE — PROGRESS NOTES
Daily Note     Today's date: 2024  Patient name: Priya Florez  : 1955  MRN: [id number]  Referring provider: Chetna Catalan DO  Dx:   Encounter Diagnosis     ICD-10-CM    1. Parkinson's disease without dyskinesia, unspecified whether manifestations fluctuate  G20.A1                      Subjective: no complaints upon arrival, compliant with HEP.       Objective: See treatment diary below      Assessment: Continues to have unsynchronized movements when alternating arms and LE with marching and approaching turns and objects, vc to maintain pace, step length, and reciprocal arm swing. Had patient stop and restart when sequencing is off. Gradually making larger amplitude arm motions t/o UE dynamic exercises. Patient offers no complaints post session, would benefit from continued PT.      Plan: Continue per plan of care.      POC expires: 3/17/24  Date     Visit count 1 2 3 4 5 6 7 8 9    FOTO No FOTO              Precautions: gait dysfunction, fall risk, watch fatigue    Manuals                                                         Neuro Re-Ed             Side stepping over pods 3 laps Hurdles 3 laps  5 Hurdles 4 laps  4x 4 laps 4 laps 4 laps  4 laps 4 laps    Step over pods 3 laps Hurdles 3 laps  5 hurdles 4 laps 4x 4 laps 4 laps  4 laps  4 laps 4 laps    Fig 8 around 2 pods 5 times Around cones 5x  Around cones 5x   Cones 5x  Cones 5x  Cones 6x  Cones 6x Cones 6x    FW amb with high march (recip UE)  80 ft  80 ft 40ft x 2 40 ft 2x  80 ft 1x  80'x1  40'x1  Opp knee tap 80' x1 40'x 1 opp knee tap 80' x1 40'x 1 opp knee tap    Serpentine around cones   3 laps  3 laps 3x 4x  5 laps  5 laps  5 laps  5 laps                              Ther Ex             NuStep  L4 6'  L4 8'  L4  6' L5 8'  L5 8'  L5 8'  L5 8' L5 8'    UBE      3' FW 3' FW 3 FW 3 fw                 Seated wand elevation/FF 10x 15x  2x10 2x10 2x10  2x10 20x 20x 20x    Seated wand chest press 10x 15x  2x10  2x10 2x10 2x10 20x  20x 20x    pulleys    10x 20x  20x NT 20x 30x    Warrior II     5x ea side  8x ea side 10x ea side 10x ea side 10x ea side                  Ther Activity             Sit to stand large ROM w/ arms  10x 10x 10x 10x 10x  10x  10x 10x    Directional change between pods  NV Square 2 laps R/L, FW/SS/BW 3x 10x  10x  10x  Square  Square 10x    Sit to stand between 2 chairs (emphasis on turning)  NV 10 ft, 10x 6x 6x  10x  10x  10x 10x                 Gait Training             Amb around gym focus on stride length and ricipr UE     1 lap 80 ft x2 100 ft x2 100 ft x 2 100 ft x2                  Modalities

## 2024-01-19 NOTE — PROGRESS NOTES
PT Re-Evaluation     Today's date: 2024  Patient name: Priya Florez  : 1955  MRN: [id number]  Referring provider: Chetna Catalan DO  Dx:   Encounter Diagnosis     ICD-10-CM    1. Parkinson's disease without dyskinesia, unspecified whether manifestations fluctuate  G20.A1           Start Time: 1045  Stop Time: 1130  Total time in clinic (min): 45 minutes    Assessment  Assessment details: Priya Florez is a 68 y.o. female who presents with pain, decreased strength, decreased ROM, ambulatory dysfunction, and balance dysfunction. Due to these impairments, Patient has difficulty performing a/iadls, recreational activities, and engaging in social activities. Patient's clinical presentation is consistent with their referring diagnosis of PD. Patient seen for one month PT. Making slow steady gains with improved gait speed and quality, improved arm swing and step length and decrease in freeze episodes. Patient would benefit from continued skilled physical therapy to address their aforementioned impairments, improve their level of function and to improve their overall quality of life.  Impairments: abnormal gait, abnormal or restricted ROM, activity intolerance, impaired balance, impaired physical strength, lacks appropriate home exercise program and poor posture   Understanding of Dx/Px/POC: good   Prognosis: good    Goals  ST-4 WEEKS -   1.  Increase postural awareness MET  2.  Improve awareness of gait dynamics with increased step length - MET  3.  Increase LE strength by 1/2 MMT grade in all deficient planes. - progressing towards  4. Increase UE elevation to 130 or greater  LT-8 WEEKS - progressing towards  1. Patient to participate in meal preparation  2. Increase walking tolerance to 10 minutes  3. Independent with HEP and/or fitness program.  4. Improve TUG 5 seconds - MET  5. Decrease fall risk - progressing towards      6. Maximize safety awareness    Plan  Patient would benefit from:  skilled physical therapy  Planned modality interventions: unattended electrical stimulation  Planned therapy interventions: activity modification, behavior modification, body mechanics training, aquatic therapy, flexibility, functional ROM exercises, home exercise program, IADL retraining, joint mobilization, manual therapy, neuromuscular re-education, patient education, postural training, strengthening, stretching, therapeutic activities and therapeutic exercise  Frequency: 2x per week.  Duration in weeks: 8  Plan of Care beginning date: 1/22/2024  Plan of Care expiration date: 3/18/2024  Treatment plan discussed with: patient      Subjective Evaluation    History of Present Illness  Mechanism of injury: Notes her primary concern is the freezing episodes.  Notes symptoms began over the past year and progressively worse over the past two months.     Notes up until 8 weeks ago was walking 5/8 miles per day. Notes it is exhausting now to walk. Notes walking tolerance less than 5 minutes. No longer grocery shopping.  Notes low back pain with standing and walking.   Denies falls. No assistive device. Dependent in meal prep. Sometimes requires assistance with sit to stand transfer.  Notes sometimes needs help with dressing. Independent with showering.     Notes reduced function in left hand. Trigger finger 2020. Last two years lost mobility in hand. 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 Had to stop due to hand mobility loss and weakness. Using adaptive equipment to open cans. Left hand dominant.     1/22/24  Patient notes she is doing better since PT start. Notes significant decrease in freeze episodes. Feels steadier on feet with reduced fatigue. Notes she is able to stand longer before her back hurts but back pain remains present. Doing HEP daily.  Quality of life: good    Patient Goals  Patient goals for therapy: increased strength and increased motion  Patient goal: improve freezing gait; strengthen  back  Pain  Location: low back pain  Quality: tight and dull ache  Relieving factors: rest and change in position  Aggravating factors: walking and standing    Social Support  Stairs in house: yes (13 with landing)   Lives with: significant other    Hand dominance: left        Objective     Static Posture     Head  Forward.    Shoulders  Rounded.    Thoracic Spine  Hyperkyphosis.    Strength/Myotome Testing     Left Shoulder     Planes of Motion   Flexion: 4-   Abduction: 3+     Right Shoulder     Planes of Motion   Flexion: 4-   Abduction: 4-     Left Elbow   Flexion: WFL  Extension: WFL    Right Elbow   Flexion: WFL  Extension: WFL    Left Wrist/Hand   Wrist extension: WFL  Wrist flexion: WFL    Right Wrist/Hand   Wrist extension: WFL  Wrist flexion: WFL    Left Hip   Planes of Motion   Flexion: 4-  Abduction: 4-    Right Hip   Planes of Motion   Flexion: 4  Abduction: 4-    Left Knee   Flexion: 4  Extension: 4-    Right Knee   Flexion: 4  Extension: 4    Left Ankle/Foot   Dorsiflexion: WFL.     Right Ankle/Foot   Dorsiflexion: WFL.     Ambulation     Ambulation: Level Surfaces   Ambulation without assistive device: independent    Ambulation: Stairs   Ascend stairs: independent  Pattern: reciprocal  Railings: one rail  Descend stairs: independent  Pattern: reciprocal  Railings: one rail    Observational Gait   Increased walking speed. Decreased stride length.   Stride length comments: improving    Quality of Movement During Gait   Trunk  Forward lean.     Comments   Improved arm swing when intentional about swing  Improved transitional movements and turns with less steps required  Neuro Exam:     Sensation   Light touch LE: left WNL and right WNL    Coordination   Finger to nose: left WNL and right WNL    Transfers   Sit to stand: independent   Sit to supine: independent   Supine to sit: independent   Into the car: independent   Out of the car: independent     Functional outcomes   TUG: 10 (seconds)  Functional  "outcome comment: Sit to stand test x 5  - 11 seconds               POC expires: 3/17/24  Date 12/19 12/22 12/26 12/29 1/5 1/8 1/12 1/17 1/19 1/22   Visit count 1 2 3 4 5 6 7 8 9    FOTO No FOTO         RE  EVAL     Precautions: gait dysfunction, fall risk, watch fatigue      Manuals 12/19 12/22 12/26 12/29 1/5 1/8 1/12 1/17 1/19 1/22                Neuro Re-Ed             Side stepping over HURDLES 3 laps Hurdles 3 laps  5 Hurdles 4 laps  4x 4 laps 4 laps 4 laps  4 laps 4 laps 4 laps   Step over   HURDLES 3 laps Hurdles 3 laps  5 hurdles 4 laps 4x 4 laps 4 laps  4 laps  4 laps 4 laps 4 laps   Fig 8 around 2 pods 5 times Around cones 5x  Around cones 5x   Cones 5x  Cones 5x  Cones 6x  Cones 6x Cones 6x    FW amb with high march (recip UE)  80 ft  80 ft 40ft x 2 40 ft 2x  80 ft 1x  80'x1  40'x1  Opp knee tap 80' x1 40'x 1 opp knee tap 80' x1 40'x 1 opp knee tap 80'x1   Serpentine around cones   3 laps  3 laps 3x 4x  5 laps  5 laps  5 laps  5 laps                              Ther Ex             NuStep  L4 6'  L4 8'  L4  6' L5 8'  L5 8'  L5 8'  L5 8' L5 8'    UBE      3' FW 3' FW 3 FW 3 fw                 Seated wand elevation/FF 10x 15x  2x10 2x10 2x10 2x10 20x 20x 20x    Seated wand chest press 10x 15x  2x10  2x10 2x10 2x10 20x  20x 20x    pulleys    10x 20x  20x NT 20x 30x    Warrior II     5x ea side  8x ea side 10x ea side 10x ea side 10x ea side                  Hooklying spinal twist          10x   Ball with [facility]          10x   TB hip ER with [facility]          BTB  10x   Ball heel slides          10x   Core ball press standing          5\"/10x   TB row          BTB  2x10   TB ext          BTB  2x10                             Ther Activity             Sit to stand large ROM w/ arms  10x 10x 10x 10x 10x  10x  10x 10x 5x   Directional change between pods  NV Square 2 laps R/L, FW/SS/BW 3x 10x  10x  10x  Square  Square 10x    Sit to stand between 2 chairs (emphasis on turning)  NV 10 ft, 10x 6x 6x  10x  10x  10x 10x     "             Gait Training             Amb around gym focus on stride length and ricipr UE     1 lap 80 ft x2 100 ft x2 100 ft x 2 100 ft x2  x1                Modalities

## 2024-01-20 ENCOUNTER — HOSPITAL ENCOUNTER (OUTPATIENT)
Dept: CT IMAGING | Facility: CLINIC | Age: 69
Discharge: HOME | End: 2024-01-20
Payer: MEDICARE

## 2024-01-20 DIAGNOSIS — G20.A1 PARKINSON'S DISEASE, UNSPECIFIED WHETHER DYSKINESIA PRESENT, UNSPECIFIED WHETHER MANIFESTATIONS FLUCTUATE: ICD-10-CM

## 2024-01-20 PROCEDURE — G1004 CDSM NDSC: HCPCS

## 2024-01-20 PROCEDURE — 70450 CT HEAD/BRAIN W/O DYE: CPT

## 2024-01-22 ENCOUNTER — EVALUATION (OUTPATIENT)
Dept: PHYSICAL THERAPY | Age: 69
End: 2024-01-22
Payer: MEDICARE

## 2024-01-22 DIAGNOSIS — G20.A1 PARKINSON'S DISEASE WITHOUT DYSKINESIA, UNSPECIFIED WHETHER MANIFESTATIONS FLUCTUATE: Primary | ICD-10-CM

## 2024-01-22 PROCEDURE — 97112 NEUROMUSCULAR REEDUCATION: CPT | Performed by: PHYSICAL THERAPIST

## 2024-01-22 PROCEDURE — 97110 THERAPEUTIC EXERCISES: CPT | Performed by: PHYSICAL THERAPIST

## 2024-01-24 ENCOUNTER — APPOINTMENT (OUTPATIENT)
Dept: PHYSICAL THERAPY | Age: 69
End: 2024-01-24
Payer: MEDICARE

## 2024-01-26 ENCOUNTER — APPOINTMENT (OUTPATIENT)
Dept: PHYSICAL THERAPY | Age: 69
End: 2024-01-26
Payer: MEDICARE

## 2024-01-26 ENCOUNTER — OFFICE VISIT (OUTPATIENT)
Dept: PHYSICAL THERAPY | Age: 69
End: 2024-01-26
Payer: MEDICARE

## 2024-01-26 DIAGNOSIS — G20.A1 PARKINSON'S DISEASE WITHOUT DYSKINESIA, UNSPECIFIED WHETHER MANIFESTATIONS FLUCTUATE: Primary | ICD-10-CM

## 2024-01-26 PROCEDURE — 97110 THERAPEUTIC EXERCISES: CPT | Performed by: PHYSICAL THERAPIST

## 2024-01-26 PROCEDURE — 97112 NEUROMUSCULAR REEDUCATION: CPT | Performed by: PHYSICAL THERAPIST

## 2024-01-26 NOTE — PROGRESS NOTES
Daily Note     Today's date: 2024  Patient name: Priya Florez  : 1955  MRN: [id number]  Referring provider: Chetna Catalan DO  Dx:   Encounter Diagnosis     ICD-10-CM    1. Parkinson's disease without dyskinesia, unspecified whether manifestations fluctuate  G20.A1           Start Time: 1030  Stop Time: 1115  Total time in clinic (min): 45 minutes    Subjective: Patient offers no new complaints this date. Notes she has been doing her exercises every day at home. Denies back pain today.       Objective: See treatment diary below      Assessment: Tolerated treatment well. Patient would benefit from continued PT Fatigued post treatment. Modified mat program due fatigue. Improved step length and gait speed/jody. Motivated.      Plan: Continue per plan of care.      POC expires: 3/17/24  Date    Visit count 1 2 3 4 5 6 7 8 9 10   FOTO No FOTO         RE  EVAL     POC expires: 3/17/24  Date             Visit count 11            FOTO                    Precautions: gait dysfunction, fall risk, watch fatigue      Manuals                 Neuro Re-Ed             Side stepping over HURDLES 4 laps Hurdles 3 laps  5 Hurdles 4 laps  4x 4 laps 4 laps 4 laps  4 laps 4 laps 4 laps   Step over   HURDLES 4  laps Hurdles 3 laps  5 hurdles 4 laps 4x 4 laps 4 laps  4 laps  4 laps 4 laps 4 laps   Fig 8 around 2 pods 5 times Around cones 5x  Around cones 5x   Cones 5x  Cones 5x  Cones 6x  Cones 6x Cones 6x    FW amb with high march (recip UE) x1 80 ft  80 ft 40ft x 2 40 ft 2x  80 ft 1x  80'x1  40'x1  Opp knee tap 80' x1 40'x 1 opp knee tap 80' x1 40'x 1 opp knee tap 80'x1   Serpentine around cones  nt 3 laps  3 laps 3x 4x  5 laps  5 laps  5 laps  5 laps                              Ther Ex             NuStep L5  8' L4 6'  L4 8'  L4  6' L5 8'  L5 8'  L5 8'  L5 8' L5 8'    UBE      3' FW 3' FW 3 FW 3 fw                "  Seated wand elevation/FF 20x 15x  2x10 2x10 2x10 2x10 20x 20x 20x    Seated wand chest press 20x 15x  2x10  2x10 2x10 2x10 20x  20x 20x    pulleys 20x   10x 20x  20x NT 20x 30x    Warrior II 5x eac hside    5x ea side  8x ea side 10x ea side 10x ea side 10x ea side                  Hooklying spinal twist          10x   Ball with PFMC          10x   TB hip ER with PFMC          BTB  10x   Ball heel slides          10x   Core ball press standing          5\"/10x   TB row BTB  2x10         BTB  2x10   TB ext BTB  2x10         BTB  2x10                             Ther Activity             Sit to stand large ROM w/ arms 10x 10x 10x 10x 10x 10x  10x  10x 10x 5x   Directional change between pods 7x NV Square 2 laps R/L, FW/SS/BW 3x 10x  10x  10x  Square  Square 10x    Sit to stand between 2 chairs (emphasis on turning) 11x NV 10 ft, 10x 6x 6x  10x  10x  10x 10x                 Gait Training             Amb around gym focus on stride length and ricipr UE 2x    1 lap 80 ft x2 100 ft x2 100 ft x 2 100 ft x2  x1                Modalities                                                 "

## 2024-01-31 ENCOUNTER — APPOINTMENT (OUTPATIENT)
Dept: PHYSICAL THERAPY | Age: 69
End: 2024-01-31
Payer: MEDICARE

## 2024-01-31 ENCOUNTER — OFFICE VISIT (OUTPATIENT)
Dept: PHYSICAL THERAPY | Age: 69
End: 2024-01-31
Payer: MEDICARE

## 2024-01-31 DIAGNOSIS — G20.A1 PARKINSON'S DISEASE WITHOUT DYSKINESIA, UNSPECIFIED WHETHER MANIFESTATIONS FLUCTUATE: Primary | ICD-10-CM

## 2024-01-31 PROCEDURE — 97112 NEUROMUSCULAR REEDUCATION: CPT

## 2024-01-31 PROCEDURE — 97110 THERAPEUTIC EXERCISES: CPT

## 2024-01-31 NOTE — PROGRESS NOTES
Daily Note     Today's date: 2024  Patient name: Priya Florez  : 1955  MRN: [id number]  Referring provider: Chetna Catalan DO  Dx:   Encounter Diagnosis     ICD-10-CM    1. Parkinson's disease without dyskinesia, unspecified whether manifestations fluctuate  G20.A1           Start Time: 1045  Stop Time: 1134  Total time in clinic (min): 49 minutes    Subjective: Reports no new concerns.       Objective: See treatment diary below      Assessment: Some fatigue noted with high marching around clinic, cues to maintain height of knees t/o. Encouraged alt knee tap with opp UE for increased rotation and reciprocal movement. Added bridges on table for glute strength, no c/o pain during or post.  Patient would benefit from continued PT      Plan: Continue per plan of care.      POC expires: 3/17/24  Date    Visit count 1 2 3 4 5 6 7 8 9 10   FOTO No FOTO         RE  EVAL     POC expires: 3/17/24  Date            Visit count 11 12           FOTO                    Precautions: gait dysfunction, fall risk, watch fatigue      Manuals                 Neuro Re-Ed             Side stepping over HURDLES 4 laps 4 laps  4x 4 laps 4 laps 4 laps  4 laps 4 laps 4 laps   Step over   HURDLES 4  laps 4 laps   4x 4 laps 4 laps  4 laps  4 laps 4 laps 4 laps   Fig 8 around 2 pods 5 times 5x    Cones 5x  Cones 5x  Cones 6x  Cones 6x Cones 6x    FW amb with high march (recip UE) x1 1 lap  40ft x 2 40 ft 2x  80 ft 1x  80'x1  40'x1  Opp knee tap 80' x1 40'x 1 opp knee tap 80' x1 40'x 1 opp knee tap 80'x1   Serpentine around cones  nt 5x   3x 4x  5 laps  5 laps  5 laps  5 laps                              Ther Ex             NuStep L5  8' L5 8'   L4  6' L5 8'  L5 8'  L5 8'  L5 8' L5 8'    UBE      3' FW 3' FW 3 FW 3 fw                 Seated wand elevation/FF 20x 20x   2x10 2x10 2x10 20x 20x 20x    Seated wand chest press 20x  "20x   2x10 2x10 2x10 20x  20x 20x    pulleys 20x 30x   10x 20x  20x NT 20x 30x    Warrior II 5x eac hside 5 ea side    5x ea side  8x ea side 10x ea side 10x ea side 10x ea side                  Hooklying spinal twist          10x   Ball with PFMC          10x   TB hip ER with PFMC          BTB  10x   Ball heel slides          10x   Core ball press standing          5\"/10x   bridges  10x            TB row BTB  2x10 BTB 2x10        BTB  2x10   TB ext BTB  2x10 BTB 2x10        BTB  2x10                             Ther Activity             Sit to stand large ROM w/ arms 10x 10x   10x 10x 10x  10x  10x 10x 5x   Directional change between pods 7x 7x  3x 10x  10x  10x  Square  Square 10x    Sit to stand between 2 chairs (emphasis on turning) 11x 10x   6x 6x  10x  10x  10x 10x                 Gait Training             Amb around gym focus on stride length and ricipr UE 2x 3 laps   1 lap 80 ft x2 100 ft x2 100 ft x 2 100 ft x2  x1                Modalities                                                   "

## 2024-02-02 ENCOUNTER — OFFICE VISIT (OUTPATIENT)
Dept: PHYSICAL THERAPY | Age: 69
End: 2024-02-02
Payer: MEDICARE

## 2024-02-02 ENCOUNTER — TELEPHONE (OUTPATIENT)
Dept: NEUROLOGY | Facility: CLINIC | Age: 69
End: 2024-02-02

## 2024-02-02 DIAGNOSIS — G20.A1 PARKINSON'S DISEASE WITHOUT DYSKINESIA, UNSPECIFIED WHETHER MANIFESTATIONS FLUCTUATE: Primary | ICD-10-CM

## 2024-02-02 PROCEDURE — 97110 THERAPEUTIC EXERCISES: CPT

## 2024-02-02 PROCEDURE — 97112 NEUROMUSCULAR REEDUCATION: CPT

## 2024-02-02 NOTE — PROGRESS NOTES
Daily Note     Today's date: 2024  Patient name: Priya Florez  : 1955  MRN: [id number]  Referring provider: Chetna Catalan DO  Dx:   Encounter Diagnosis     ICD-10-CM    1. Parkinson's disease without dyskinesia, unspecified whether manifestations fluctuate  G20.A1                      Subjective: No new concerns expressed.       Objective: See treatment diary below      Assessment: Improved reciprocal pattern during normal ambulation, less shuffling present, continues to fatigue quickly with high knee walking. Occasional stutter step when changing direction.       Plan: Continue per plan of care.      POC expires: 3/17/24  Date    Visit count 1 2 3 4 5 6 7 8 9 10   FOTO No FOTO         RE  EVAL     POC expires: 3/17/24  Date  2/          Visit count 11 12 13          FOTO                    Precautions: gait dysfunction, fall risk, watch fatigue      Manuals  2/2                   Neuro Re-Ed             Side stepping over HURDLES 4 laps 4 laps 4 laps   4 laps 4 laps  4 laps 4 laps 4 laps   Step over   HURDLES 4  laps 4 laps  4 laps   4 laps  4 laps  4 laps 4 laps 4 laps   Fig 8 around 2 pods 5 times 5x  5x   Cones 5x  Cones 6x  Cones 6x Cones 6x    FW amb with high march (recip UE) x1 1 lap 1 lap   80 ft 1x  80'x1  40'x1  Opp knee tap 80' x1 40'x 1 opp knee tap 80' x1 40'x 1 opp knee tap 80'x1   Serpentine around cones  nt 5x  5x   5 laps  5 laps  5 laps  5 laps                              Ther Ex             NuStep L5  8' L5 8'  L5 8'   L5 8'  L5 8'  L5 8' L5 8'    UBE      3' FW 3' FW 3 FW 3 fw                 Seated wand elevation/FF 20x 20x  20x   2x10 20x 20x 20x    Seated wand chest press 20x 20x  20x   2x10 20x  20x 20x    pulleys 20x 30x  30x   20x NT 20x 30x    Warrior II 5x eac hside 5 ea side  5x ea side   8x ea side 10x ea side 10x ea side 10x ea side                  Hooklying spinal twist    "       10x   Ball with PFMC          10x   TB hip ER with PFMC          BTB  10x   Ball heel slides          10x   Core ball press standing          5\"/10x   bridges  10x            TB row BTB  2x10 BTB 2x10 BTB 2x10       BTB  2x10   TB ext BTB  2x10 BTB 2x10 BTB 2x10       BTB  2x10                             Ther Activity             Sit to stand large ROM w/ arms 10x 10x  10x    10x  10x  10x 10x 5x   Directional change between pods 7x 7x Square 10x   10x  10x  Square  Square 10x    Sit to stand between 2 chairs (emphasis on turning) 11x 10x  10x   10x  10x  10x 10x                 Gait Training             Amb around gym focus on stride length and ricipr UE 2x 3 laps 3 laps   80 ft x2 100 ft x2 100 ft x 2 100 ft x2  x1                Modalities                                                     "

## 2024-02-02 NOTE — TELEPHONE ENCOUNTER
Discussed with the patient MRI findings she is not keen to see a neurosurgeon at this time will discuss with me at her next visit

## 2024-02-07 ENCOUNTER — OFFICE VISIT (OUTPATIENT)
Dept: PHYSICAL THERAPY | Age: 69
End: 2024-02-07
Payer: MEDICARE

## 2024-02-07 ENCOUNTER — APPOINTMENT (OUTPATIENT)
Dept: PHYSICAL THERAPY | Age: 69
End: 2024-02-07
Payer: MEDICARE

## 2024-02-07 DIAGNOSIS — G20.A1 PARKINSON'S DISEASE WITHOUT DYSKINESIA, UNSPECIFIED WHETHER MANIFESTATIONS FLUCTUATE: Primary | ICD-10-CM

## 2024-02-07 PROCEDURE — 97112 NEUROMUSCULAR REEDUCATION: CPT

## 2024-02-07 PROCEDURE — 97110 THERAPEUTIC EXERCISES: CPT

## 2024-02-07 NOTE — PROGRESS NOTES
Daily Note     Today's date: 2024  Patient name: Priya Florez  : 1955  MRN: [id number]  Referring provider: Chetna Catalan DO  Dx:   Encounter Diagnosis     ICD-10-CM    1. Parkinson's disease without dyskinesia, unspecified whether manifestations fluctuate  G20.A1           Start Time: 1130  Stop Time: 1215  Total time in clinic (min): 45 minutes    Subjective: Reports some knee pain and clicking, would like to skip the high marches today.       Objective: See treatment diary below      Assessment: Improved arm swing during gait and better control of proper sequencing. Minimal hesitation during directional changes. Good recall of her program with cues only for dosage and CS for safety. No LOB t/o session. Patient would benefit from continued PT.      Plan: Continue per plan of care.      POC expires: 3/17/24  Date    Visit count 1 2 3 4 5 6 7 8 9 10   FOTO No FOTO         RE  EVAL     POC expires: 3/17/24  Date  2/         Visit count 11 12 13 14         FOTO                    Precautions: gait dysfunction, fall risk, watch fatigue      Manuals  2/                Neuro Re-Ed             Side stepping over HURDLES 4 laps 4 laps 4 laps 4 laps   4 laps 4 laps  4 laps 4 laps 4 laps   Step over   HURDLES 4  laps 4 laps  4 laps 4 laps   4 laps  4 laps  4 laps 4 laps 4 laps   Fig 8 around 2 pods 5 times 5x  5x 5 laps  Cones 5x  Cones 6x  Cones 6x Cones 6x    FW amb with high march (recip UE) x1 1 lap 1 lap NP  80 ft 1x  80'x1  40'x1  Opp knee tap 80' x1 40'x 1 opp knee tap 80' x1 40'x 1 opp knee tap 80'x1   Serpentine around cones  nt 5x  5x 5 laps   5 laps  5 laps  5 laps  5 laps                              Ther Ex             NuStep L5  8' L5 8'  L5 8' L5 8'   L5 8'  L5 8'  L5 8' L5 8'    UBE      3' FW 3' FW 3 FW 3 fw                 Seated wand elevation/FF 20x 20x  20x 30x  2x10 20x 20x 20x   "  Seated wand chest press 20x 20x  20x 30x   2x10 20x  20x 20x    pulleys 20x 30x  30x 30x  20x NT 20x 30x    Warrior II 5x eac hside 5 ea side  5x ea side 5x ea side   8x ea side 10x ea side 10x ea side 10x ea side                  Hooklying spinal twist          10x   Ball with PFMC          10x   TB hip ER with PFMC          BTB  10x   Ball heel slides          10x   Core ball press standing          5\"/10x   bridges  10x            TB row BTB  2x10 BTB 2x10 BTB 2x10 BTB 30x      BTB  2x10   TB ext BTB  2x10 BTB 2x10 BTB 2x10 BTB 30x       BTB  2x10                             Ther Activity             Sit to stand large ROM w/ arms 10x 10x  10x    10x  10x  10x 10x 5x   Directional change between pods 7x 7x Square 10x Square 10x   10x  10x  Square  Square 10x    Sit to stand between 2 chairs (emphasis on turning) 11x 10x  10x 10x   10x  10x  10x 10x                 Gait Training             Amb around gym focus on stride length and ricipr UE 2x 3 laps 3 laps 3 laps   80 ft x2 100 ft x2 100 ft x 2 100 ft x2  x1                Modalities                                                       "

## 2024-02-09 ENCOUNTER — OFFICE VISIT (OUTPATIENT)
Dept: PHYSICAL THERAPY | Age: 69
End: 2024-02-09
Payer: MEDICARE

## 2024-02-09 DIAGNOSIS — G20.A1 PARKINSON'S DISEASE WITHOUT DYSKINESIA, UNSPECIFIED WHETHER MANIFESTATIONS FLUCTUATE: Primary | ICD-10-CM

## 2024-02-09 PROCEDURE — 97112 NEUROMUSCULAR REEDUCATION: CPT

## 2024-02-09 PROCEDURE — 97110 THERAPEUTIC EXERCISES: CPT

## 2024-02-09 PROCEDURE — 97530 THERAPEUTIC ACTIVITIES: CPT

## 2024-02-09 NOTE — PROGRESS NOTES
Daily Note     Today's date: 2024  Patient name: Priya Florez  : 1955  MRN: [id number]  Referring provider: Chetna Catalan DO  Dx:   Encounter Diagnosis     ICD-10-CM    1. Parkinson's disease without dyskinesia, unspecified whether manifestations fluctuate  G20.A1                      Subjective: Notes she is doing really well, reports her walking at home as improved- patient is able to walk for longer periods of time.  Notes reflexive core TE has helped with her LBP.      Objective: See treatment diary below      Assessment:, Patient is able to complete charted exercises with minimal cuing. Additionally, patient demonstrating improved movement patterns and appropriate with ambulation and dynamic balance activities.      Patient was provided with updated home exercise program and demonstrates verbal understanding of prescribed exercises and instructions. Patient advised to stop performing home exercise program if symptoms increase or new complaints developed.        Plan: Continue per plan of care.      POC expires: 3/17/24  Date    Visit count 1 2 3 4 5 6 7 8 9 10   FOTO No FOTO         RE  EVAL     POC expires: 3/17/24  Date  2/        Visit count 11 12 13 14 15        FOTO                    Precautions: gait dysfunction, fall risk, watch fatigue      Manuals  2/2 2/7 2                Neuro Re-Ed             Side stepping over HURDLES 4 laps 4 laps 4 laps 4 laps  4 laps  4 laps  4 laps 4 laps 4 laps   Step over   HURDLES 4  laps 4 laps  4 laps 4 laps  4 laps  4 laps  4 laps 4 laps 4 laps   Fig 8 around 2 pods 5 times 5x  5x 5 laps 5 laps  Cones 6x  Cones 6x Cones 6x    FW amb with high march (recip UE) x1 1 lap 1 lap NP   80'x1  40'x1  Opp knee tap 80' x1 40'x 1 opp knee tap 80' x1 40'x 1 opp knee tap 80'x1   Serpentine around cones  nt 5x  5x 5 laps  5 laps  5 laps  5 laps  5 laps                "               Ther Ex             NuStep L5  8' L5 8'  L5 8' L5 8'  NT  L5 8'  L5 8' L5 8'    UBE       3' FW 3 FW 3 fw                 Seated wand elevation/FF 20x 20x  20x 30x 30x  20x 20x 20x    Seated wand chest press 20x 20x  20x 30x  30x  20x  20x 20x    pulleys 20x 30x  30x 30x 30x  NT 20x 30x    Warrior II 5x eac hside 5 ea side  5x ea side 5x ea side  5x ea side   10x ea side 10x ea side 10x ea side                  Hooklying spinal twist          10x   Ball with PFMC          10x   TB hip ER with PFMC          BTB  10x   Ball heel slides          10x   Core ball press standing          5\"/10x   bridges  10x            TB row BTB  2x10 BTB 2x10 BTB 2x10 BTB 30x BTB 30x     BTB  2x10   TB ext BTB  2x10 BTB 2x10 BTB 2x10 BTB 30x  BTB 30x     BTB  2x10                             Ther Activity             Sit to stand large ROM w/ arms 10x 10x  10x   10x   10x  10x 10x 5x   Directional change between pods 7x 7x Square 10x Square 10x  Square 10  10x  Square  Square 10x    Sit to stand between 2 chairs (emphasis on turning) 11x 10x  10x 10x  10x  10x  10x 10x                 Gait Training             Amb around gym focus on stride length and ricipr UE 2x 3 laps 3 laps 3 laps  3 laps   100 ft x2 100 ft x 2 100 ft x2  x1                Modalities                                                         "

## 2024-02-12 ENCOUNTER — OFFICE VISIT (OUTPATIENT)
Dept: PHYSICAL THERAPY | Age: 69
End: 2024-02-12
Payer: MEDICARE

## 2024-02-12 DIAGNOSIS — G20.A1 PARKINSON'S DISEASE WITHOUT DYSKINESIA, UNSPECIFIED WHETHER MANIFESTATIONS FLUCTUATE: Primary | ICD-10-CM

## 2024-02-12 PROCEDURE — 97530 THERAPEUTIC ACTIVITIES: CPT

## 2024-02-12 PROCEDURE — 97110 THERAPEUTIC EXERCISES: CPT

## 2024-02-12 PROCEDURE — 97112 NEUROMUSCULAR REEDUCATION: CPT

## 2024-02-12 NOTE — PROGRESS NOTES
Daily Note     Today's date: 2024  Patient name: Priya Florez  : 1955  MRN: [id number]  Referring provider: Chetna Catalan DO  Dx:   Encounter Diagnosis     ICD-10-CM    1. Parkinson's disease without dyskinesia, unspecified whether manifestations fluctuate  G20.A1           Start Time: 1110  Stop Time: 1150  Total time in clinic (min): 40 minutes    Subjective: Reports no new concerns.       Objective: See treatment diary below      Assessment: Patient has good recall of her program, few cues for proper dosage. CS at times for safety, no LOB t/o session. Improved reciprocal arm swing during gait with less hesitation when approaching unexpected obstacles or during directional change. Patient would benefit from continued PT to ensure independence with her program prior to discharge.       Plan: Continue per plan of care.  Potential discharge next visit.     POC expires: 3/17/24  Date    Visit count 1 2 3 4 5 6 7 8 9 10   FOTO No FOTO         RE  EVAL     POC expires: 3/17/24  Date  2/ 2/        Visit count 11 12 13 14 15 16       FOTO                    Precautions: gait dysfunction, fall risk, watch fatigue      Manuals  2/2 2/7                 Neuro Re-Ed             Side stepping over HURDLES 4 laps 4 laps 4 laps 4 laps  4 laps 4 laps   4 laps 4 laps   Step over   HURDLES 4  laps 4 laps  4 laps 4 laps  4 laps 4 laps   4 laps 4 laps   Fig 8 around 2 pods 5 times 5x  5x 5 laps 5 laps 5 laps   Cones 6x    FW amb with high march (recip UE) x1 1 lap 1 lap NP     80' x1 40'x 1 opp knee tap 80'x1   Serpentine around cones  nt 5x  5x 5 laps  5 laps 5 laps   5 laps                              Ther Ex             NuStep L5  8' L5 8'  L5 8' L5 8'  NT L5 10'    L5 8'    UBE         3 fw                 Seated wand elevation/FF 20x 20x  20x 30x 30x 30x   20x    Seated wand chest press 20x 20x  20x 30x  30x  "30x   20x    pulleys 20x 30x  30x 30x 30x 30x   30x    Warrior II 5x eac hside 5 ea side  5x ea side 5x ea side  5x ea side  5x ea side   10x ea side                  Hooklying spinal twist          10x   Ball with PFMC          10x   TB hip ER with PFMC          BTB  10x   Ball heel slides          10x   Core ball press standing          5\"/10x   bridges  10x            TB row BTB  2x10 BTB 2x10 BTB 2x10 BTB 30x BTB 30x BTB 30x     BTB  2x10   TB ext BTB  2x10 BTB 2x10 BTB 2x10 BTB 30x  BTB 30x BTB 30x     BTB  2x10                             Ther Activity             Sit to stand large ROM w/ arms 10x 10x  10x   10x     10x 5x   Directional change between pods 7x 7x Square 10x Square 10x  Square 10 Square 10x    Square 10x    Sit to stand between 2 chairs (emphasis on turning) 11x 10x  10x 10x  10x 10x   10x                 Gait Training             Amb around gym focus on stride length and ricipr UE 2x 3 laps 3 laps 3 laps  3 laps  2 laps  2x   100 ft x2  x1                Modalities                                                           "

## 2024-02-14 ENCOUNTER — APPOINTMENT (OUTPATIENT)
Dept: PHYSICAL THERAPY | Age: 69
End: 2024-02-14
Payer: MEDICARE

## 2024-02-14 ENCOUNTER — OFFICE VISIT (OUTPATIENT)
Dept: PHYSICAL THERAPY | Age: 69
End: 2024-02-14
Payer: MEDICARE

## 2024-02-14 DIAGNOSIS — G20.A1 PARKINSON'S DISEASE WITHOUT DYSKINESIA, UNSPECIFIED WHETHER MANIFESTATIONS FLUCTUATE: Primary | ICD-10-CM

## 2024-02-14 PROCEDURE — 97112 NEUROMUSCULAR REEDUCATION: CPT

## 2024-02-14 PROCEDURE — 97110 THERAPEUTIC EXERCISES: CPT

## 2024-02-14 NOTE — PROGRESS NOTES
Daily Note     Today's date: 2024  Patient name: Priya Florez  : 1955  MRN: [id number]  Referring provider: Chetna Catalan DO  Dx:   Encounter Diagnosis     ICD-10-CM    1. Parkinson's disease without dyskinesia, unspecified whether manifestations fluctuate  G20.A1           Start Time: 1110  Stop Time: 1156  Total time in clinic (min): 46 minutes    Subjective: No new concerns. Continues to work on her program at home. Feels she has made drastic improvements in her gait with less shuffling and less hesitation with the exception of her first few steps after sitting for longer periods of time.       Objective: See treatment diary below      Assessment: Patient has good recall of her exercise program and is independent. Demonstrates ability to perform balance activities safely without intervention needed from therapist. Patient will continue to work on her exercises at home independently at this time. Decreased shuffling during gait as well as less hesitation when approaching obstacles.       Plan: Patient will be discharged by her primary PT.      POC expires: 3/17/24  Date    Visit count 1 2 3 4 5 6 7 8 9 10   FOTO No FOTO         RE  EVAL     POC expires: 3/17/24  Date  2/2 2/      Visit count 11 12 13 14 15 16 17      FOTO                    Precautions: gait dysfunction, fall risk, watch fatigue      Manuals  2/2 2/7                 Neuro Re-Ed             Side stepping over HURDLES 4 laps 4 laps 4 laps 4 laps  4 laps 4 laps 4 laps   4 laps 4 laps   Step over   HURDLES 4  laps 4 laps  4 laps 4 laps  4 laps 4 laps 4 laps   4 laps 4 laps   Fig 8 around 2 pods 5 times 5x  5x 5 laps 5 laps 5 laps 6 laps  Cones 6x    FW amb with high march (recip UE) x1 1 lap 1 lap NP     80' x1 40'x 1 opp knee tap 80'x1   Serpentine around cones  nt 5x  5x 5 laps  5 laps 5 laps 5 laps  5 laps                "               Ther Ex             NuStep L5  8' L5 8'  L5 8' L5 8'  NT L5 10'  L5 10'   L5 8'    UBE         3 fw                 Seated wand elevation/FF 20x 20x  20x 30x 30x 30x 30x  20x    Seated wand chest press 20x 20x  20x 30x  30x 30x 30x   20x    pulleys 20x 30x  30x 30x 30x 30x 30x   30x    Warrior II 5x eac hside 5 ea side  5x ea side 5x ea side  5x ea side  5x ea side 5x ea side  10x ea side                  Hooklying spinal twist          10x   Ball with PFMC          10x   TB hip ER with PFMC          BTB  10x   Ball heel slides          10x   Core ball press standing          5\"/10x   bridges  10x            TB row BTB  2x10 BTB 2x10 BTB 2x10 BTB 30x BTB 30x BTB 30x  BTB 30x   BTB  2x10   TB ext BTB  2x10 BTB 2x10 BTB 2x10 BTB 30x  BTB 30x BTB 30x  BTB 30x    BTB  2x10                             Ther Activity             Sit to stand large ROM w/ arms 10x 10x  10x   10x     10x 5x   Directional change between pods 7x 7x Square 10x Square 10x  Square 10 Square 10x  Square 10x   Square 10x    Sit to stand between 2 chairs (emphasis on turning) 11x 10x  10x 10x  10x 10x 10x   10x                 Gait Training             Amb around gym focus on stride length and ricipr UE 2x 3 laps 3 laps 3 laps  3 laps  2 laps  2x 2 laps  2x  100 ft x2  x1                Modalities                                                             "

## 2024-02-19 ENCOUNTER — APPOINTMENT (OUTPATIENT)
Dept: PHYSICAL THERAPY | Age: 69
End: 2024-02-19
Payer: MEDICARE

## 2024-02-21 ENCOUNTER — APPOINTMENT (OUTPATIENT)
Dept: PHYSICAL THERAPY | Age: 69
End: 2024-02-21
Payer: MEDICARE

## 2024-02-22 ENCOUNTER — APPOINTMENT (OUTPATIENT)
Dept: PHYSICAL THERAPY | Age: 69
End: 2024-02-22
Payer: MEDICARE

## 2024-02-25 ENCOUNTER — HOSPITAL ENCOUNTER (EMERGENCY)
Facility: HOSPITAL | Age: 69
Discharge: HOME | End: 2024-02-25
Attending: INTERNAL MEDICINE
Payer: MEDICARE

## 2024-02-25 ENCOUNTER — APPOINTMENT (EMERGENCY)
Dept: RADIOLOGY | Facility: HOSPITAL | Age: 69
End: 2024-02-25
Payer: MEDICARE

## 2024-02-25 VITALS
HEART RATE: 75 BPM | DIASTOLIC BLOOD PRESSURE: 74 MMHG | SYSTOLIC BLOOD PRESSURE: 154 MMHG | TEMPERATURE: 97.7 F | OXYGEN SATURATION: 97 % | RESPIRATION RATE: 18 BRPM

## 2024-02-25 DIAGNOSIS — R07.89 ATYPICAL CHEST PAIN: Primary | ICD-10-CM

## 2024-02-25 LAB
ALBUMIN SERPL BCP-MCNC: 4.4 G/DL (ref 3.5–5)
ALP SERPL-CCNC: 103 U/L (ref 34–104)
ALT SERPL W P-5'-P-CCNC: 42 U/L (ref 7–52)
ANION GAP SERPL CALCULATED.3IONS-SCNC: 10 MMOL/L
AST SERPL W P-5'-P-CCNC: 178 U/L (ref 13–39)
BASOPHILS # BLD AUTO: 0.08 THOUSANDS/ÂΜL (ref 0–0.1)
BASOPHILS NFR BLD AUTO: 1 % (ref 0–1)
BILIRUB SERPL-MCNC: 0.58 MG/DL (ref 0.2–1)
BUN SERPL-MCNC: 19 MG/DL (ref 5–25)
CALCIUM SERPL-MCNC: 9.4 MG/DL (ref 8.4–10.2)
CARDIAC TROPONIN I PNL SERPL HS: 3 NG/L
CHLORIDE SERPL-SCNC: 99 MMOL/L (ref 96–108)
CO2 SERPL-SCNC: 29 MMOL/L (ref 21–32)
CREAT SERPL-MCNC: 0.98 MG/DL (ref 0.6–1.3)
EOSINOPHIL # BLD AUTO: 0.16 THOUSAND/ÂΜL (ref 0–0.61)
EOSINOPHIL NFR BLD AUTO: 2 % (ref 0–6)
ERYTHROCYTE [DISTWIDTH] IN BLOOD BY AUTOMATED COUNT: 12.4 % (ref 11.6–15.1)
GFR SERPL CREATININE-BSD FRML MDRD: 59 ML/MIN/1.73SQ M
GLUCOSE SERPL-MCNC: 130 MG/DL (ref 65–140)
HCT VFR BLD AUTO: 42.2 % (ref 34.8–46.1)
HGB BLD-MCNC: 13.8 G/DL (ref 11.5–15.4)
IMM GRANULOCYTES # BLD AUTO: 0.04 THOUSAND/UL (ref 0–0.2)
IMM GRANULOCYTES NFR BLD AUTO: 0 % (ref 0–2)
LYMPHOCYTES # BLD AUTO: 2.97 THOUSANDS/ÂΜL (ref 0.6–4.47)
LYMPHOCYTES NFR BLD AUTO: 27 % (ref 14–44)
MCH RBC QN AUTO: 30.9 PG (ref 26.8–34.3)
MCHC RBC AUTO-ENTMCNC: 32.7 G/DL (ref 31.4–37.4)
MCV RBC AUTO: 94 FL (ref 82–98)
MONOCYTES # BLD AUTO: 0.71 THOUSAND/ÂΜL (ref 0.17–1.22)
MONOCYTES NFR BLD AUTO: 7 % (ref 4–12)
NEUTROPHILS # BLD AUTO: 6.93 THOUSANDS/ÂΜL (ref 1.85–7.62)
NEUTS SEG NFR BLD AUTO: 63 % (ref 43–75)
NRBC BLD AUTO-RTO: 0 /100 WBCS
PLATELET # BLD AUTO: 324 THOUSANDS/UL (ref 149–390)
PMV BLD AUTO: 9.4 FL (ref 8.9–12.7)
POTASSIUM SERPL-SCNC: 3.5 MMOL/L (ref 3.5–5.3)
PROT SERPL-MCNC: 6.7 G/DL (ref 6.4–8.4)
RBC # BLD AUTO: 4.47 MILLION/UL (ref 3.81–5.12)
SODIUM SERPL-SCNC: 138 MMOL/L (ref 135–147)
WBC # BLD AUTO: 10.89 THOUSAND/UL (ref 4.31–10.16)

## 2024-02-25 PROCEDURE — 84484 ASSAY OF TROPONIN QUANT: CPT | Performed by: INTERNAL MEDICINE

## 2024-02-25 PROCEDURE — 71045 X-RAY EXAM CHEST 1 VIEW: CPT

## 2024-02-25 PROCEDURE — 85025 COMPLETE CBC W/AUTO DIFF WBC: CPT | Performed by: INTERNAL MEDICINE

## 2024-02-25 PROCEDURE — 80053 COMPREHEN METABOLIC PANEL: CPT | Performed by: INTERNAL MEDICINE

## 2024-02-25 PROCEDURE — 93005 ELECTROCARDIOGRAM TRACING: CPT

## 2024-02-25 PROCEDURE — 96374 THER/PROPH/DIAG INJ IV PUSH: CPT

## 2024-02-25 PROCEDURE — 99285 EMERGENCY DEPT VISIT HI MDM: CPT

## 2024-02-25 PROCEDURE — 99284 EMERGENCY DEPT VISIT MOD MDM: CPT | Performed by: INTERNAL MEDICINE

## 2024-02-25 PROCEDURE — 36415 COLL VENOUS BLD VENIPUNCTURE: CPT | Performed by: INTERNAL MEDICINE

## 2024-02-25 PROCEDURE — C9113 INJ PANTOPRAZOLE SODIUM, VIA: HCPCS | Performed by: INTERNAL MEDICINE

## 2024-02-25 RX ORDER — SODIUM CHLORIDE 9 MG/ML
3 INJECTION INTRAVENOUS
Status: DISCONTINUED | OUTPATIENT
Start: 2024-02-25 | End: 2024-02-25 | Stop reason: HOSPADM

## 2024-02-25 RX ORDER — PANTOPRAZOLE SODIUM 40 MG/10ML
40 INJECTION, POWDER, LYOPHILIZED, FOR SOLUTION INTRAVENOUS ONCE
Status: COMPLETED | OUTPATIENT
Start: 2024-02-25 | End: 2024-02-25

## 2024-02-25 RX ADMIN — PANTOPRAZOLE SODIUM 40 MG: 40 INJECTION, POWDER, FOR SOLUTION INTRAVENOUS at 18:11

## 2024-02-25 NOTE — ED PROVIDER NOTES
History  No chief complaint on file.    68-year-old female accompanied by her  presents emergency room with midsternal chest pain nonradiating which started around 3 PM today patient states she was sitting watching television she had midsternal chest pain described as right between her breasts nonradiating, no shortness of breath, she did feel sweaty or diaphoretic, no nausea no vomiting no lightheadedness dizziness denies palpitations tachycardia.  Patient has not been ill recently had no fever or chills.  She has no coronary history she is a non-smoker.  When asked to describe the pain she states it was a 6 out of 10 at this time she states is almost completely resolved.  Patient is had no abdominal pain nausea vomiting diaphoresis    Patient has no history of hypertension, diabetes she states she has mild hyperlipidemia denies any history of ever using tobacco and does not use alcohol.  Past medical significant for Parkinson disease .        Prior to Admission Medications   Prescriptions Last Dose Informant Patient Reported? Taking?   [employer] FIBER GUMMIES PO  Self Yes No   Sig: Take by mouth   Cholecalciferol 100 MCG (4000 UT) CAPS  Self Yes No   Sig: Take 4,000 mg by mouth daily   Cyanocobalamin (B-12 PO)  Self Yes No   Sig: Take by mouth in the morning   Multiple Vitamins-Minerals (Centrum Silver 50+Women) TABS  Self Yes No   carbidopa-levodopa (SINEMET)  mg per tablet   No No   Sig: Take 1 tablet by mouth 3 (three) times a day   clotrimazole-betamethasone (LOTRISONE) 1-0.05 % cream  Self No No   Sig: Apply topically 2 (two) times a day      Facility-Administered Medications: None       No past medical history on file.    Past Surgical History:   Procedure Laterality Date    TONSILLECTOMY      TUBAL LIGATION         Family History   Problem Relation Age of Onset    Breast cancer Mother 40    Dementia Father     No Known Problems Sister     No Known Problems Sister     No Known Problems Sister     No  Known Problems Maternal Grandmother     Prostate cancer Maternal Grandfather     No Known Problems Paternal Grandmother     Cancer Maternal Aunt      I have reviewed and agree with the history as documented.    E-Cigarette/Vaping    E-Cigarette Use Never User      E-Cigarette/Vaping Substances     Social History     Tobacco Use    Smoking status: Never    Smokeless tobacco: Never   Vaping Use    Vaping status: Never Used   Substance Use Topics    Alcohol use: Not Currently    Drug use: Not Currently       Review of Systems   Constitutional: Negative.    Respiratory: Negative.     Cardiovascular:  Positive for chest pain. Negative for palpitations and leg swelling.   Gastrointestinal: Negative.    Genitourinary: Negative.    Musculoskeletal: Negative.    Neurological: Negative.    Hematological: Negative.    Psychiatric/Behavioral: Negative.         Physical Exam  Physical Exam  Vitals and nursing note reviewed. Exam conducted with a chaperone present (spouse).   Constitutional:       Appearance: Normal appearance. She is normal weight.   HENT:      Head: Normocephalic and atraumatic.   Eyes:      Extraocular Movements: Extraocular movements intact.      Conjunctiva/sclera: Conjunctivae normal.   Cardiovascular:      Rate and Rhythm: Normal rate and regular rhythm.   Pulmonary:      Effort: Pulmonary effort is normal.      Breath sounds: Normal breath sounds.   Abdominal:      General: Abdomen is flat.      Palpations: Abdomen is soft.   Musculoskeletal:         General: Normal range of motion.      Cervical back: Normal range of motion and neck supple.   Skin:     General: Skin is warm and dry.   Neurological:      General: No focal deficit present.      Mental Status: She is alert and oriented to person, place, and time.   Psychiatric:         Mood and Affect: Mood normal.         Thought Content: Thought content normal.         Judgment: Judgment normal.         Vital Signs  ED Triage Vitals   Temp Pulse Resp BP  SpO2   -- -- -- -- --      Temp src Heart Rate Source Patient Position - Orthostatic VS BP Location FiO2 (%)   -- -- -- -- --      Pain Score       --           There were no vitals filed for this visit.      Visual Acuity      ED Medications  Medications - No data to display    Diagnostic Studies  Results Reviewed       None                   No orders to display              Procedures  ECG 12 Lead Documentation Only    Date/Time: 2/25/2024 4:38 PM    Performed by: Srinivas Jimenez MD  Authorized by: Srinivas Jimenez MD    Indications / Diagnosis:  Chest pain  ECG reviewed by me, the ED Provider: yes    Patient location:  ED  Interpretation:     Interpretation: abnormal    Quality:     Tracing quality:  Limited by artifact  Rate:     ECG rate:  95    ECG rate assessment: normal    Rhythm:     Rhythm: sinus rhythm    Ectopy:     Ectopy: PVCs    QRS:     QRS axis:  Normal  Conduction:     Conduction: normal    ST segments:     ST segments:  Normal  T waves:     T waves: non-specific    Q waves:     Q waves:  III and aVF           ED Course                                             Medical Decision Making  3-year-old female accompanied by her  presented with midsternal chest pain between her breast nonradiating and diaphoresis.  Initial troponin was less than 5, EKG was unremarkable.  I gave the patient some Protonix and she states she is significantly improved.  Offered the patient outpatient Protonix and she at this time would rather not take anything she states she will take Tums if the symptoms return.  Patient has Parkinson's and she will follow-up with her neurologist    Amount and/or Complexity of Data Reviewed  Labs: ordered.  Radiology: ordered.    Risk  Prescription drug management.             Disposition  Final diagnoses:   None     ED Disposition       None          Follow-up Information    None         Patient's Medications   Discharge Prescriptions    No medications on file       No discharge  procedures on file.    PDMP Review       None            ED Provider  Electronically Signed by             Srinivas Jimenez MD  02/25/24 1188

## 2024-02-26 DIAGNOSIS — R79.89 ABNORMAL LFTS: Primary | ICD-10-CM

## 2024-02-26 LAB
ATRIAL RATE: 101 BPM
QRS AXIS: -1 DEGREES
QRSD INTERVAL: 76 MS
QT INTERVAL: 364 MS
QTC INTERVAL: 469 MS
T WAVE AXIS: 19 DEGREES
VENTRICULAR RATE: 100 BPM

## 2024-02-26 PROCEDURE — 93010 ELECTROCARDIOGRAM REPORT: CPT | Performed by: INTERNAL MEDICINE

## 2024-03-05 ENCOUNTER — APPOINTMENT (OUTPATIENT)
Dept: LAB | Facility: CLINIC | Age: 69
End: 2024-03-05
Payer: MEDICARE

## 2024-03-05 DIAGNOSIS — R79.89 ABNORMAL LFTS: ICD-10-CM

## 2024-03-05 LAB
ALBUMIN SERPL BCP-MCNC: 4.2 G/DL (ref 3.5–5)
ALP SERPL-CCNC: 122 U/L (ref 34–104)
ALT SERPL W P-5'-P-CCNC: 48 U/L (ref 7–52)
ANION GAP SERPL CALCULATED.3IONS-SCNC: 7 MMOL/L
AST SERPL W P-5'-P-CCNC: 15 U/L (ref 13–39)
BILIRUB SERPL-MCNC: 0.55 MG/DL (ref 0.2–1)
BUN SERPL-MCNC: 17 MG/DL (ref 5–25)
CALCIUM SERPL-MCNC: 9 MG/DL (ref 8.4–10.2)
CHLORIDE SERPL-SCNC: 102 MMOL/L (ref 96–108)
CO2 SERPL-SCNC: 29 MMOL/L (ref 21–32)
CREAT SERPL-MCNC: 0.9 MG/DL (ref 0.6–1.3)
GFR SERPL CREATININE-BSD FRML MDRD: 65 ML/MIN/1.73SQ M
GLUCOSE SERPL-MCNC: 117 MG/DL (ref 65–140)
POTASSIUM SERPL-SCNC: 3.9 MMOL/L (ref 3.5–5.3)
PROT SERPL-MCNC: 6.6 G/DL (ref 6.4–8.4)
SODIUM SERPL-SCNC: 138 MMOL/L (ref 135–147)

## 2024-03-05 PROCEDURE — 36415 COLL VENOUS BLD VENIPUNCTURE: CPT

## 2024-03-05 PROCEDURE — 80053 COMPREHEN METABOLIC PANEL: CPT

## 2024-03-06 DIAGNOSIS — Z13.220 SCREENING, LIPID: ICD-10-CM

## 2024-03-06 DIAGNOSIS — Z13.1 SCREENING FOR DIABETES MELLITUS: ICD-10-CM

## 2024-03-06 DIAGNOSIS — G20.A1 PARKINSON'S DISEASE WITHOUT DYSKINESIA, UNSPECIFIED WHETHER MANIFESTATIONS FLUCTUATE: Primary | ICD-10-CM

## 2024-03-06 DIAGNOSIS — E53.8 B12 DEFICIENCY: ICD-10-CM

## 2024-03-06 PROBLEM — G20.B1 PARKINSON'S DISEASE WITH DYSKINESIA WITHOUT FLUCTUATING MANIFESTATIONS: Status: ACTIVE | Noted: 2023-12-13

## 2024-03-12 ENCOUNTER — OFFICE VISIT (OUTPATIENT)
Dept: NEUROLOGY | Facility: CLINIC | Age: 69
End: 2024-03-12
Payer: MEDICARE

## 2024-03-12 VITALS
SYSTOLIC BLOOD PRESSURE: 120 MMHG | BODY MASS INDEX: 28.7 KG/M2 | OXYGEN SATURATION: 98 % | HEIGHT: 63 IN | DIASTOLIC BLOOD PRESSURE: 74 MMHG | HEART RATE: 79 BPM

## 2024-03-12 DIAGNOSIS — G20.A1 PARKINSON'S DISEASE WITHOUT DYSKINESIA, UNSPECIFIED WHETHER MANIFESTATIONS FLUCTUATE: Primary | ICD-10-CM

## 2024-03-12 PROCEDURE — 99214 OFFICE O/P EST MOD 30 MIN: CPT | Performed by: PSYCHIATRY & NEUROLOGY

## 2024-03-12 NOTE — PROGRESS NOTES
Priya Florez is a 68 y.o. female.   Chief Complaint   Patient presents with    Parkinson's Disease       Assessment:    1. Parkinson's disease with dyskinesia without fluctuating manifestations       Plan:  Patient's CAT scan of the brain results from 1/20/2024 were reviewed with the patient she has an incidental 3.4 cm CSF density prominent extra-axial space over the left frontal vertex most compatible with an arachnoid cyst with some mild mass effect upon adjacent gyri, patient is not keen to see a neurosurgeon and we will repeat a CAT scan in the next 6 months to a year since patient could not tolerate the MRI of the brain, blood work from 2/25/2024 CBC showed slightly increased WBC count at 10.8 I advised her to follow-up with the family physician her vitamin B12 and TSH were normal from 12/7/2023 and a Lyme test.      Patient is tolerating Sinemet well at 25/100 mg 1 tablet 3 times a day and was advised to continue with same I advised her to continue with home physical therapy she finished physical therapy and to take fall and safety precautions to eat a healthy nutritious diet take a multivitamin every day to keep her blood pressure cholesterol and sugar under control go to the hospital if has any worsening symptoms and call me otherwise to see me back in 6 months or sooner if needed and follow-up with the other physicians.          Subjective:    HPI   Patient is here accompanied with her partner in follow-up for her freezing episodes and shuffling gait and Parkinson's tremor since her last visit she tells me that she is doing much better since she has been taking Sinemet she denies having any freezing episodes she has been taking it 3 times a day and is not having any side effects she finished physical therapy that seems to have helped her she is not using any cane or walker and feels pretty steady and has not had any falls no bowel and bladder incontinence no vision or speech difficulty, no dizziness ,no  "other complaints    Vitals:    03/12/24 1445   BP: 120/74   BP Location: Left arm   Patient Position: Sitting   Cuff Size: Standard   Pulse: 79   SpO2: 98%   Height: 5' 3\" (1.6 m)       Current Medications    Current Outpatient Medications:     carbidopa-levodopa (SINEMET)  mg per tablet, Take 1 tablet by mouth 3 (three) times a day, Disp: 90 tablet, Rfl: 5    Cholecalciferol 100 MCG (4000 UT) CAPS, Take 4,000 mg by mouth daily, Disp: , Rfl:     [employer] FIBER GUMMIES PO, Take by mouth, Disp: , Rfl:     Cyanocobalamin (B-12 PO), Take by mouth in the morning, Disp: , Rfl:     Multiple Vitamins-Minerals (Centrum Silver 50+Women) TABS, , Disp: , Rfl:     clotrimazole-betamethasone (LOTRISONE) 1-0.05 % cream, Apply topically 2 (two) times a day (Patient not taking: Reported on 3/12/2024), Disp: 30 g, Rfl: 0      Allergies  Meperidine and Pistachio nut (diagnostic) - food allergy    Past Medical History  History reviewed. No pertinent past medical history.      Past Surgical History:  Past Surgical History:   Procedure Laterality Date    TONSILLECTOMY      TUBAL LIGATION           Family History:  Family History   Problem Relation Age of Onset    Breast cancer Mother 40    Dementia Father     No Known Problems Sister     No Known Problems Sister     No Known Problems Sister     No Known Problems Maternal Grandmother     Prostate cancer Maternal Grandfather     No Known Problems Paternal Grandmother     Cancer Maternal Aunt        Social History:   reports that she has never smoked. She has never used smokeless tobacco. She reports that she does not currently use alcohol. She reports that she does not currently use drugs.    I have reviewed the past medical history, surgical history, social and family history, current medications, allergies vitals, review of systems, and updated this information as appropriate today.   Objective:    Physical Exam    Neurological Exam     GENERAL:  Cooperative in no acute distress. " Well-developed and well-nourished     HEAD and NECK   Head is atraumatic normocephalic with no lesions or masses. Neck is supple with full range of motion     CARDIOVASCULAR  Carotid Arteries-no carotid bruits.     NEUROLOGIC:  Mental Status-the patient is awake alert and oriented without aphasia or apraxia  Cranial Nerves: Visual fields are full to confrontation.  Extraocular movements are full without nystagmus. Pupils are 2-1/2 mm and reactive. Face is symmetrical to light touch. Movements of facial expression move symmetrically. Hearing is normal to finger rub bilaterally. Soft palate lifts symmetrically. Shoulder shrug is symmetrical. Tongue is midline without atrophy.  Motor: No drift is noted on arm extension. Strength is full in the upper and lower extremities with normal bulk and cogwheeling rigidity, mild resting tremor of the left hand compared to the right hand  Sensory: Intact to temperature and vibratory sensation in the upper and lower extremities bilaterally. Cortical function is intact.  Coordination: Finger to nose testing is performed accurately.  Ambulates with a stooped posture and decreased arm swing  Reflexes:    1+ and symmetrical    ROS:  Review of Systems   Constitutional:  Negative for appetite change, fatigue and fever.   HENT: Negative.  Negative for hearing loss, tinnitus, trouble swallowing and voice change.    Eyes: Negative.  Negative for photophobia, pain and visual disturbance.   Respiratory: Negative.  Negative for shortness of breath.    Cardiovascular: Negative.  Negative for palpitations.   Gastrointestinal: Negative.  Negative for nausea and vomiting.   Endocrine: Negative.  Negative for cold intolerance.   Genitourinary: Negative.  Negative for dysuria, frequency and urgency.   Musculoskeletal:  Negative for back pain, gait problem, myalgias, neck pain and neck stiffness.   Skin: Negative.  Negative for rash.   Allergic/Immunologic: Negative.    Neurological: Negative.   Negative for dizziness, tremors, seizures, syncope, facial asymmetry, speech difficulty, weakness, light-headedness, numbness and headaches.   Hematological: Negative.  Does not bruise/bleed easily.   Psychiatric/Behavioral: Negative.  Negative for confusion, hallucinations and sleep disturbance.

## 2024-04-18 ENCOUNTER — HOSPITAL ENCOUNTER (OUTPATIENT)
Dept: MAMMOGRAPHY | Facility: HOSPITAL | Age: 69
Discharge: HOME | End: 2024-04-18
Payer: MEDICARE

## 2024-04-18 DIAGNOSIS — Z12.31 SCREENING MAMMOGRAM FOR BREAST CANCER: ICD-10-CM

## 2024-04-18 PROCEDURE — 77063 BREAST TOMOSYNTHESIS BI: CPT

## 2024-04-18 PROCEDURE — 77067 SCR MAMMO BI INCL CAD: CPT

## 2024-06-28 ENCOUNTER — APPOINTMENT (OUTPATIENT)
Dept: LAB | Facility: CLINIC | Age: 69
End: 2024-06-28
Payer: MEDICARE

## 2024-06-28 DIAGNOSIS — E53.8 B12 DEFICIENCY: ICD-10-CM

## 2024-06-28 DIAGNOSIS — Z13.1 SCREENING FOR DIABETES MELLITUS: ICD-10-CM

## 2024-06-28 DIAGNOSIS — G20.A1 PARKINSON'S DISEASE WITHOUT DYSKINESIA, UNSPECIFIED WHETHER MANIFESTATIONS FLUCTUATE: ICD-10-CM

## 2024-06-28 DIAGNOSIS — Z13.220 SCREENING, LIPID: ICD-10-CM

## 2024-06-28 LAB
ALBUMIN SERPL BCG-MCNC: 4.3 G/DL (ref 3.5–5)
ALP SERPL-CCNC: 81 U/L (ref 34–104)
ALT SERPL W P-5'-P-CCNC: 5 U/L (ref 7–52)
ANION GAP SERPL CALCULATED.3IONS-SCNC: 12 MMOL/L (ref 4–13)
AST SERPL W P-5'-P-CCNC: 15 U/L (ref 13–39)
BASOPHILS # BLD AUTO: 0.05 THOUSANDS/ÂΜL (ref 0–0.1)
BASOPHILS NFR BLD AUTO: 1 % (ref 0–1)
BILIRUB SERPL-MCNC: 0.62 MG/DL (ref 0.2–1)
BUN SERPL-MCNC: 12 MG/DL (ref 5–25)
CALCIUM SERPL-MCNC: 9.5 MG/DL (ref 8.4–10.2)
CHLORIDE SERPL-SCNC: 101 MMOL/L (ref 96–108)
CHOLEST SERPL-MCNC: 244 MG/DL
CO2 SERPL-SCNC: 27 MMOL/L (ref 21–32)
CREAT SERPL-MCNC: 0.96 MG/DL (ref 0.6–1.3)
EOSINOPHIL # BLD AUTO: 0.08 THOUSAND/ÂΜL (ref 0–0.61)
EOSINOPHIL NFR BLD AUTO: 1 % (ref 0–6)
ERYTHROCYTE [DISTWIDTH] IN BLOOD BY AUTOMATED COUNT: 12.3 % (ref 11.6–15.1)
GFR SERPL CREATININE-BSD FRML MDRD: 60 ML/MIN/1.73SQ M
GLUCOSE P FAST SERPL-MCNC: 95 MG/DL (ref 65–99)
HCT VFR BLD AUTO: 42.4 % (ref 34.8–46.1)
HDLC SERPL-MCNC: 63 MG/DL
HGB BLD-MCNC: 14.1 G/DL (ref 11.5–15.4)
IMM GRANULOCYTES # BLD AUTO: 0.02 THOUSAND/UL (ref 0–0.2)
IMM GRANULOCYTES NFR BLD AUTO: 0 % (ref 0–2)
LDLC SERPL CALC-MCNC: 154 MG/DL (ref 0–100)
LYMPHOCYTES # BLD AUTO: 2.17 THOUSANDS/ÂΜL (ref 0.6–4.47)
LYMPHOCYTES NFR BLD AUTO: 33 % (ref 14–44)
MCH RBC QN AUTO: 31.5 PG (ref 26.8–34.3)
MCHC RBC AUTO-ENTMCNC: 33.3 G/DL (ref 31.4–37.4)
MCV RBC AUTO: 95 FL (ref 82–98)
MONOCYTES # BLD AUTO: 0.5 THOUSAND/ÂΜL (ref 0.17–1.22)
MONOCYTES NFR BLD AUTO: 8 % (ref 4–12)
NEUTROPHILS # BLD AUTO: 3.8 THOUSANDS/ÂΜL (ref 1.85–7.62)
NEUTS SEG NFR BLD AUTO: 57 % (ref 43–75)
NONHDLC SERPL-MCNC: 181 MG/DL
NRBC BLD AUTO-RTO: 0 /100 WBCS
PLATELET # BLD AUTO: 293 THOUSANDS/UL (ref 149–390)
PMV BLD AUTO: 10 FL (ref 8.9–12.7)
POTASSIUM SERPL-SCNC: 4.1 MMOL/L (ref 3.5–5.3)
PROT SERPL-MCNC: 6.6 G/DL (ref 6.4–8.4)
RBC # BLD AUTO: 4.47 MILLION/UL (ref 3.81–5.12)
SODIUM SERPL-SCNC: 140 MMOL/L (ref 135–147)
TRIGL SERPL-MCNC: 136 MG/DL
VIT B12 SERPL-MCNC: 697 PG/ML (ref 180–914)
WBC # BLD AUTO: 6.62 THOUSAND/UL (ref 4.31–10.16)

## 2024-06-28 PROCEDURE — 80053 COMPREHEN METABOLIC PANEL: CPT

## 2024-06-28 PROCEDURE — 80061 LIPID PANEL: CPT

## 2024-06-28 PROCEDURE — 36415 COLL VENOUS BLD VENIPUNCTURE: CPT

## 2024-06-28 PROCEDURE — 85025 COMPLETE CBC W/AUTO DIFF WBC: CPT

## 2024-06-28 PROCEDURE — 82607 VITAMIN B-12: CPT

## 2024-07-03 ENCOUNTER — RA CDI HCC (OUTPATIENT)
Dept: OTHER | Facility: HOSPITAL | Age: 69
End: 2024-07-03

## 2024-07-10 ENCOUNTER — OFFICE VISIT (OUTPATIENT)
Dept: FAMILY MEDICINE CLINIC | Facility: CLINIC | Age: 69
End: 2024-07-10
Payer: MEDICARE

## 2024-07-10 VITALS
BODY MASS INDEX: 30.12 KG/M2 | DIASTOLIC BLOOD PRESSURE: 76 MMHG | TEMPERATURE: 97.1 F | SYSTOLIC BLOOD PRESSURE: 132 MMHG | HEIGHT: 63 IN | OXYGEN SATURATION: 98 % | HEART RATE: 80 BPM | WEIGHT: 170 LBS

## 2024-07-10 DIAGNOSIS — R22.2 CHEST WALL MASS: ICD-10-CM

## 2024-07-10 DIAGNOSIS — Z00.00 MEDICARE ANNUAL WELLNESS VISIT, SUBSEQUENT: Primary | ICD-10-CM

## 2024-07-10 DIAGNOSIS — Z23 ENCOUNTER FOR IMMUNIZATION: ICD-10-CM

## 2024-07-10 DIAGNOSIS — E78.00 PURE HYPERCHOLESTEROLEMIA: ICD-10-CM

## 2024-07-10 PROCEDURE — G0009 ADMIN PNEUMOCOCCAL VACCINE: HCPCS

## 2024-07-10 PROCEDURE — 90677 PCV20 VACCINE IM: CPT

## 2024-07-10 PROCEDURE — G0439 PPPS, SUBSEQ VISIT: HCPCS | Performed by: FAMILY MEDICINE

## 2024-07-10 NOTE — PROGRESS NOTES
Ambulatory Visit  Name: Priya Florez      : 1955      MRN: [id number]  Encounter Provider: Chetna Catalan DO  Encounter Date: 7/10/2024   Encounter department: [facility]    Assessment & Plan   1. Medicare annual wellness visit, subsequent  2. Pure hypercholesterolemia  Assessment & Plan:  Somewhat decreased from last year, pt is working on lifestyle changes/weight loss. Reviewed ASCVD risk score, pt defers medication. Provided information on Mediterranean diet. Will repeat lipids in 6 months to monitor.   Orders:  -     Lipid panel; Future  3. Chest wall mass  Assessment & Plan:  Exam suggestive of cyst -- will US to further evaluate. Currently not bothersome, but if becomes so or increases in size, can consider surgical removal  Orders:  -     US superficial lump (non extremity); Future; Expected date: 07/10/2024  4. Encounter for immunization  -     Pneumococcal Conjugate Vaccine 20-valent (Pcv20)       Preventive health issues were discussed with patient, and age appropriate screening tests were ordered as noted in patient's After Visit Summary. Personalized health advice and appropriate referrals for health education or preventive services given if needed, as noted in patient's After Visit Summary.    History of Present Illness     HPI     Pt presents for AWV, lab review.     Cr 0.96/GFR 60 (stable)   Lipids: Total 244, , HDL 63, ; LFTs WNL   Glucose 95  B12 697   CBC benign     Following with Neuro for Parkinson's. Doing well with medication, completed PT and continues HEP, which she found very helpful.     Patient Care Team:  Chetna Catalan DO as PCP - General (Family Medicine)  Chetna Catalan DO (Family Medicine)    Review of Systems   Constitutional:  Negative for unexpected weight change.   HENT:  Negative for congestion, ear pain, rhinorrhea and sore throat.    Eyes:  Negative for visual disturbance.   Respiratory:  Negative for cough and shortness of  breath.    Cardiovascular:  Negative for chest pain, palpitations and leg swelling.   Gastrointestinal:  Negative for abdominal pain, blood in stool, constipation and diarrhea.   Endocrine: Negative for polyuria.   Genitourinary:  Negative for dysuria and hematuria.   Musculoskeletal:  Positive for gait problem.        (+) swelling on L elbow, improving   Neurological:  Positive for tremors. Negative for dizziness and headaches.   Psychiatric/Behavioral:  Negative for sleep disturbance.      Medical History Reviewed by provider this encounter:  Tobacco  Allergies  Meds  Problems  Med Hx  Surg Hx  Fam Hx       Annual Wellness Visit Questionnaire   Priya is here for her Subsequent Wellness visit.     Health Risk Assessment:   Patient rates overall health as very good. Patient feels that their physical health rating is much better. Patient is satisfied with their life. Eyesight was rated as same. Hearing was rated as same. Patient feels that their emotional and mental health rating is much better. Patients states they are never, rarely angry. Patient states they are never, rarely unusually tired/fatigued. Pain experienced in the last 7 days has been none. Patient states that she has experienced no weight loss or gain in last 6 months.     Depression Screening:   PHQ-2 Score: 0      Fall Risk Screening:   In the past year, patient has experienced: no history of falling in past year      Urinary Incontinence Screening:   Patient has not leaked urine accidently in the last six months.     Home Safety:  Patient does not have trouble with stairs inside or outside of their home. Patient has working smoke alarms and has working carbon monoxide detector. Home safety hazards include: none.     Nutrition:   Current diet is Regular.     Medications:   Patient is currently taking over-the-counter supplements. OTC medications include: see medication list. Patient is able to manage medications.     Activities of Daily  Living (ADLs)/Instrumental Activities of Daily Living (IADLs):   Walk and transfer into and out of bed and chair?: Yes  Dress and groom yourself?: Yes    Bathe or shower yourself?: Yes    Feed yourself? Yes  Do your laundry/housekeeping?: Yes  Manage your money, pay your bills and track your expenses?: Yes  Make your own meals?: Yes    Do your own shopping?: Yes    Durable Medical Equipment Suppliers  N/A    Previous Hospitalizations:   Any hospitalizations or ED visits within the last 12 months?: Yes    How many hospitalizations have you had in the last year?: 1-2    Advance Care Planning:   Living will: No    Durable POA for healthcare: No    Advanced directive: No    ACP document given: Yes      Cognitive Screening:   Provider or family/friend/caregiver concerned regarding cognition?: No    PREVENTIVE SCREENINGS      Cardiovascular Screening:    General: Screening Current      Diabetes Screening:     General: Screening Current      Colorectal Cancer Screening:     General: Screening Current      Breast Cancer Screening:     General: Screening Current      Cervical Cancer Screening:    General: Screening Not Indicated      Osteoporosis Screening:    General: Patient Declines    Due for: DXA Appendicular      Abdominal Aortic Aneurysm (AAA) Screening:        General: Screening Not Indicated      Lung Cancer Screening:     General: Screening Not Indicated      Hepatitis C Screening:      Hep C Screening Accepted: No     Screening, Brief Intervention, and Referral to Treatment (SBIRT)    Screening  Typical number of drinks in a day: 0  Typical number of drinks in a week: 0  Interpretation: Low risk drinking behavior.    AUDIT-C Screenin) How often did you have a drink containing alcohol in the past year? never  2) How many drinks did you have on a typical day when you were drinking in the past year? 0  3) How often did you have 6 or more drinks on one occasion in the past year? never    AUDIT-C Score:  "0  Interpretation: Score 0-2 (female): Negative screen for alcohol misuse    Single Item Drug Screening:  How often have you used an illegal drug (including marijuana) or a prescription medication for non-medical reasons in the past year? never    Single Item Drug Screen Score: 0  Interpretation: Negative screen for possible drug use disorder    Social Determinants of Health     Financial Resource Strain: Low Risk  (6/27/2023)    Overall Financial Resource Strain (CARDIA)    • Difficulty of Paying Living Expenses: Not very hard   Food Insecurity: No Food Insecurity (7/3/2024)    Hunger Vital Sign    • Worried About Running Out of Food in the Last Year: Never true    • Ran Out of Food in the Last Year: Never true   Transportation Needs: No Transportation Needs (7/3/2024)    PRAPARE - Transportation    • Lack of Transportation (Medical): No    • Lack of Transportation (Non-Medical): No   Housing Stability: Low Risk  (7/3/2024)    Housing Stability Vital Sign    • Unable to Pay for Housing in the Last Year: No    • Number of Times Moved in the Last Year: 0    • Homeless in the Last Year: No   Utilities: Not At Risk (7/3/2024)    [facility] Utilities    • Threatened with loss of utilities: No     No results found.    Objective     /76   Pulse 80   Temp (!) 97.1 °F (36.2 °C)   Ht 5' 3\" (1.6 m)   Wt 77.1 kg (170 lb)   SpO2 98%   BMI 30.11 kg/m²     Physical Exam  Vitals and nursing note reviewed.   Constitutional:       General: She is not in acute distress.     Appearance: She is well-developed.   HENT:      Head: Normocephalic and atraumatic.      Right Ear: Tympanic membrane, ear canal and external ear normal.      Left Ear: Tympanic membrane, ear canal and external ear normal.      Nose: Nose normal. No rhinorrhea.      Mouth/Throat:      Mouth: Mucous membranes are moist.      Pharynx: No oropharyngeal exudate or posterior oropharyngeal erythema.   Eyes:      Conjunctiva/sclera: Conjunctivae normal.   Neck:      " Thyroid: No thyromegaly.   Cardiovascular:      Rate and Rhythm: Normal rate and regular rhythm.   Pulmonary:      Effort: Pulmonary effort is normal. No respiratory distress.      Breath sounds: Normal breath sounds.   Abdominal:      General: Bowel sounds are normal. There is no distension.      Palpations: Abdomen is soft.      Tenderness: There is no abdominal tenderness.   Musculoskeletal:      Right lower leg: No edema.      Left lower leg: No edema.      Comments: Mild swelling over L olecranon without erythema, warmth, pain suggestive of resolving bursitis   Lymphadenopathy:      Cervical: No cervical adenopathy.   Skin:     General: Skin is warm and dry.          Neurological:      Mental Status: She is alert. Mental status is at baseline.      Motor: Tremor (resting pill-rolling tremor, most notable in L hand) present.   Psychiatric:         Mood and Affect: Mood normal.         Going to get Shingles at pharmacy, Pneumo given, COVID completed primary + booster, TDap UTD

## 2024-07-10 NOTE — PATIENT INSTRUCTIONS
ASSESSMENT/PLAN                                                       (Z00.00) Routine history and physical examination of adult  (primary encounter diagnosis)  Comment: PMH, PSH, FH, SH, medications, allergies, immunizations, and preventative health measures reviewed and updated as appropriate.  Plan: see below for plans.      (Z13.220) Screening for cholesterol level  (Z13.1) Screening for diabetes mellitus  Plan: fasting labs today.     (Z23) Need for vaccination  Plan: TDAP given today.     (Z23) Need for prophylactic vaccination and inoculation against influenza  Plan: flu shot given today.     (I10) Benign essential hypertension  Comment: new diagnosis.  Plan: EKG today; START amlodipine 5mg daily; blood pressure follow-up in 2-4 weeks.     (E66.01) Morbid obesity (H)  Comment: obesity + comorbidity (HTN - see above); known issue that I take into account for their medical decisions, no current exacerbations or new concerns.    Bharti Rodriguez MD   [facility]  [address] [address]  T: [phone], F: [phone]    SUBJECTIVE                                                      Sampson Mas is a very pleasant 36 year old male who presents for a physical.    ROS:  Constitutional: no unintentional weight loss or gain reported; no fevers, chills, or sweats  reported    Cardiovascular: no chest pain, palpitations, or edema reported  Respiratory: no cough, wheezing, shortness of breath, or dyspnea on exertion reported  Gastrointestinal: no nausea, vomiting, constipation, diarrhea, or abdominal pain reported  Genitourinary: no urinary frequency, urgency, dysuria, or hematuria reported  Integumentary: no rash or pruritus reported  Musculoskeletal: no back pain, muscle pain, joint pain, or joint swelling reported  Neurologic: no focal weakness, numbness, or tingling reported  Hematologic: no easy bruising or bleeding reported  Endocrine: no heat or cold intolerance reported; no  Patient Education     Mediterranean diet   The Basics   Written by the doctors and editors at [facility]   What is a Mediterranean diet? -- A Mediterranean diet is a heart-healthy way of eating. It includes foods and cooking styles from many countries around the [place] Sea, like [place] and [place]. The exact foods included vary from place to place.  A Mediterranean diet involves eating a lot of fruits, vegetables, nuts, and whole grains. It uses olive oil instead of other fats. It also includes some fish, poultry, and dairy products, but not a lot of red meat.  Wine is often thought of as part of a Mediterranean diet. It is not needed, and you might choose not to include it. If you do drink alcohol, limit the amount to:   For females, no more than 1 drink a day   For males, no more than 2 drinks a day  What are the benefits of a Mediterranean diet? -- A Mediterranean diet can help you:   Improve your overall health, and help you lose weight   Lower your risk of stroke   Lower your risk of heart problems such as a heart attack   Manage your blood sugar if you have diabetes  What can I eat and drink on a Mediterranean diet? -- A Mediterranean diet is more of an eating pattern than a strict diet. Try to cover two-thirds of your plate with fresh fruits and vegetables. Some examples of foods that are often part of this pattern:   Grains - Whole grains like whole-grain bread and pasta, oats, couscous, brown rice, barley, and orzo.   Fruits - Many kinds and colors of fresh, frozen, dried, or canned fruits. Frozen or canned fruits with 100% fruit juice or water (without added sugar). Examples include apples, pears, berries, melons, bananas, plums, raisins, figs, and peaches.   Vegetables - Many kinds and colors of fresh, frozen, or canned vegetables. If canned, low sodium or salt free. If frozen, without added fat and sodium. Examples include avocados, peppers, tomatoes, spinach, kale, beans, carrots, peas, olives,  cucumbers, hummus, soybeans, lentils, and kidney beans.   Dairy - Low-fat milk, cheese, and other dairy products. Greek yogurt, kefir, and plant-based milk alternatives like soy milk.   Lean meats, poultry, seafood, and proteins - [place], tuna, cod, and other fish. Shrimp, clams, scallops, and mussels. White meat chicken and turkey, eggs, dried beans, lentils, and tofu. Nuts such as walnuts, almonds, pecans, hazelnuts, cashews, peanuts, and nut butters. Seeds such as pumpkin, sesame, flax, and sunflower seeds.   Fats, oils, and other foods - Foods with healthy fats found in fish, nuts, and avocados. Olive oil or vegetable oils such as canola oil. Use onions, garlic, spices, and herbs to season food.  What foods and drinks should I avoid or limit on a Mediterranean diet? -- A Mediterranean diet involves avoiding or limiting certain types of foods. Try to avoid foods with additives like artificial sweeteners. Avoid foods that are processed, refined, or preserved. These are often foods with a very long shelf life.   Grains to avoid - White bread, pasta, white rice, crackers, and biscuits.   Fruits to avoid - Fruits canned or frozen with extra sugar.   Vegetables to avoid - Commercially prepared potatoes and vegetable mixes, regular canned vegetables and juices, and vegetables frozen with sauce or pickled vegetables.   Dairy to avoid - Whole-fat dairy products like cheese, ice cream, whole milk, cream, and buttermilk.   Lean meats, poultry, seafood, and proteins to avoid - Red meat such as beef, pork, and lamb. Processed meats such as sausages, deli meats, salami, hot dogs, and kendall.   Fats, oils, and other foods to avoid - Butter, margarine, lard, gravies, sauces, and salad dressing. Cookies, cakes, candy, doughnuts, muffins, and other sweets.  All topics are updated as new evidence becomes available and our peer review process is complete.  This topic retrieved from [employer] on: Apr 04, 2024.  Topic 124008 Version  polyuria or polydipsia reported  Psychiatric: no anxiety or depression reported    Past Medical History:   Diagnosis Date     Benign essential hypertension      History of testicular cancer 2014    s/p orchiectomy and chemotherapy     Obesity (BMI 30-39.9)      Past Surgical History:   Procedure Laterality Date     ORCHIECTOMY INGUINAL Left 09/02/2014    Procedure: ORCHIECTOMY INGUINAL;  Surgeon: Grover Singh MD;  Location: [facility]     Family History   Problem Relation Age of Onset     Hypertension Mother      Hyperlipidemia Mother      Myocardial Infarction Mother 60     Hyperlipidemia Father      Prostate Cancer Paternal Grandfather      Cerebrovascular Disease No family hx of      Colon Cancer No family hx of      Social History     Occupational History     Occupation: [employer] work   Tobacco Use     Smoking status: Never Smoker     Smokeless tobacco: Never Used   Vaping Use     Vaping Use: Never used   Substance and Sexual Activity     Alcohol use: Yes     Comment: rare     Drug use: No     Sexual activity: Not Currently   Social History Narrative    Single.    No kids.    Walks daily.      No Known Allergies     Immunization History   Administered Date(s) Administered     COVID-19,PF,Moderna 03/16/2021, 04/13/2021, 11/16/2021     Historical DTP/aP 02/07/1986, 04/16/1986, 08/08/1986, 03/17/1990, 08/22/1991, 08/21/1998     Influenza Vaccine IM > 6 months Valent IIV4 (Alfuria,Fluzone) 11/16/2021     MMR 03/17/1990, 08/21/1998     Polio, Unspecified  02/07/1986, 04/16/1986, 03/17/1990, 08/22/1991     TD (ADULT, 7+) 03/04/2008     Td (Adult), Adsorbed 03/04/2008     PREVENTATIVE HEALTH                                                      BMI: obese  Blood pressure: elevated - not on medication  Prostate CA Screening: not medically indicated at this time   Colon CA screening: not medically indicated at this time   Lung CA screening: n/a   AAA screening: n/a   Screening cholesterol: DUE  Screening diabetes: DUE  STD  2.0  Release: 32.2.4 - C32.93  © 2024 UpToDate, Inc. and/or its affiliates. All rights reserved.  Consumer Information Use and Disclaimer   Disclaimer: This generalized information is a limited summary of diagnosis, treatment, and/or medication information. It is not meant to be comprehensive and should be used as a tool to help the user understand and/or assess potential diagnostic and treatment options. It does NOT include all information about conditions, treatments, medications, side effects, or risks that may apply to a specific patient. It is not intended to be medical advice or a substitute for the medical advice, diagnosis, or treatment of a health care provider based on the health care provider's examination and assessment of a patient's specific and unique circumstances. Patients must speak with a health care provider for complete information about their health, medical questions, and treatment options, including any risks or benefits regarding use of medications. This information does not endorse any treatments or medications as safe, effective, or approved for treating a specific patient. UpToDate, Inc. and its affiliates disclaim any warranty or liability relating to this information or the use thereof.The use of this information is governed by the Terms of Use, available at https://www.Guiltlessbeauty.comtersOrderMotionuwer.com/en/know/clinical-effectiveness-terms. 2024© UpToDate, Inc. and its affiliates and/or licensors. All rights reserved.  Copyright   © 2024 UpToDate, Inc. and/or its affiliates. All rights reserved.      Medicare Preventive Visit Patient Instructions  Thank you for completing your Welcome to Medicare Visit or Medicare Annual Wellness Visit today. Your next wellness visit will be due in one year (7/11/2025).  The screening/preventive services that you may require over the next 5-10 years are detailed below. Some tests may not apply to you based off risk factors and/or age. Screening tests ordered at today's  testing: not sexually active  Alcohol misuse screening: alcohol use reviewed - no intervention indicated at this time  Immunizations: reviewed; TDAP, flu shot, and COVID-19 booster DUE    OBJECTIVE                                                      BP (!) 152/85   Pulse 86   Temp 97  F (36.1  C) (Tympanic)   Ht 1.829 m (6')   Wt 127.6 kg (281 lb 3.2 oz)   SpO2 97%   BMI 38.14 kg/m    Constitutional: well-appearing  Head, Ears, and Eyes: normocephalic; normal external auditory canal and pinna; tympanic membranes visualized and normal; normal lids and conjunctivae  Neck: supple, symmetric, no thyromegaly or lymphadenopathy  Respiratory: normal respiratory effort; clear to auscultation bilaterally  Cardiovascular: regular rate and rhythm; no edema  Gastrointestinal: soft, non-tender, and non-distended; no organomegaly or masses  Musculoskeletal: normal gait and station  Psych: normal judgment and insight; normal mood and affect; recent and remote memory intact    ---    (Note was completed, in part, with [employer] voice-recognition software. Documentation was reviewed, but some grammatical, spelling, and word errors may remain.)     visit but not completed yet may show as past due. Also, please note that scanned in results may not display below.  Preventive Screenings:  Service Recommendations Previous Testing/Comments   Colorectal Cancer Screening  * Colonoscopy    * Fecal Occult Blood Test (FOBT)/Fecal Immunochemical Test (FIT)  * Fecal DNA/Cologuard Test  * Flexible Sigmoidoscopy Age: 45-75 years old   Colonoscopy: every 10 years (may be performed more frequently if at higher risk)  OR  FOBT/FIT: every 1 year  OR  Cologuard: every 3 years  OR  Sigmoidoscopy: every 5 years  Screening may be recommended earlier than age 45 if at higher risk for colorectal cancer. Also, an individualized decision between you and your healthcare provider will decide whether screening between the ages of 76-85 would be appropriate. Colonoscopy: Not on file  FOBT/FIT: Not on file  Cologuard: 07/10/2023  Sigmoidoscopy: Not on file    Screening Current     Breast Cancer Screening Age: 40+ years old  Frequency: every 1-2 years  Not required if history of left and right mastectomy Mammogram: 04/18/2024    Screening Current   Cervical Cancer Screening Between the ages of 21-29, pap smear recommended once every 3 years.   Between the ages of 30-65, can perform pap smear with HPV co-testing every 5 years.   Recommendations may differ for women with a history of total hysterectomy, cervical cancer, or abnormal pap smears in past. Pap Smear: Not on file    Screening Not Indicated   Hepatitis C Screening Once for adults born between 1945 and 1965  More frequently in patients at high risk for Hepatitis C Hep C Antibody: Not on file        Diabetes Screening 1-2 times per year if you're at risk for diabetes or have pre-diabetes Fasting glucose: 95 mg/dL (6/28/2024)  A1C: No results in last 5 years (No results in last 5 years)  Screening Current   Cholesterol Screening Once every 5 years if you don't have a lipid disorder. May order more often based on risk factors. Lipid  panel: 06/28/2024    Screening Current     Other Preventive Screenings Covered by Medicare:  Abdominal Aortic Aneurysm (AAA) Screening: covered once if your at risk. You're considered to be at risk if you have a family history of AAA.  Lung Cancer Screening: covers low dose CT scan once per year if you meet all of the following conditions: (1) Age 55-77; (2) No signs or symptoms of lung cancer; (3) Current smoker or have quit smoking within the last 15 years; (4) You have a tobacco smoking history of at least 20 pack years (packs per day multiplied by number of years you smoked); (5) You get a written order from a healthcare provider.  Glaucoma Screening: covered annually if you're considered high risk: (1) You have diabetes OR (2) Family history of glaucoma OR (3)  aged 50 and older OR (4)  American aged 65 and older  Osteoporosis Screening: covered every 2 years if you meet one of the following conditions: (1) You're estrogen deficient and at risk for osteoporosis based off medical history and other findings; (2) Have a vertebral abnormality; (3) On glucocorticoid therapy for more than 3 months; (4) Have primary hyperparathyroidism; (5) On osteoporosis medications and need to assess response to drug therapy.   Last bone density test (DXA Scan): Not on file.  HIV Screening: covered annually if you're between the age of 15-65. Also covered annually if you are younger than 15 and older than 65 with risk factors for HIV infection. For pregnant patients, it is covered up to 3 times per pregnancy.    Immunizations:  Immunization Recommendations   Influenza Vaccine Annual influenza vaccination during flu season is recommended for all persons aged >= 6 months who do not have contraindications   Pneumococcal Vaccine   * Pneumococcal conjugate vaccine = PCV13 (Prevnar 13), PCV15 (Vaxneuvance), PCV20 (Prevnar 20)  * Pneumococcal polysaccharide vaccine = PPSV23 (Pneumovax) Adults 19-63 yo with certain  risk factors or if 65+ yo  If never received any pneumonia vaccine: recommend Prevnar 20 (PCV20)  Give PCV20 if previously received 1 dose of PCV13 or PPSV23   Hepatitis B Vaccine 3 dose series if at intermediate or high risk (ex: diabetes, end stage renal disease, liver disease)   Respiratory syncytial virus (RSV) Vaccine - COVERED BY MEDICARE PART D  * RSVPreF3 (Arexvy) CDC recommends that adults 60 years of age and older may receive a single dose of RSV vaccine using shared clinical decision-making (SCDM)   Tetanus (Td) Vaccine - COST NOT COVERED BY MEDICARE PART B Following completion of primary series, a booster dose should be given every 10 years to maintain immunity against tetanus. Td may also be given as tetanus wound prophylaxis.   Tdap Vaccine - COST NOT COVERED BY MEDICARE PART B Recommended at least once for all adults. For pregnant patients, recommended with each pregnancy.   Shingles Vaccine (Shingrix) - COST NOT COVERED BY MEDICARE PART B  2 shot series recommended in those 19 years and older who have or will have weakened immune systems or those 50 years and older     Health Maintenance Due:      Topic Date Due    Hepatitis C Screening  Never done    Breast Cancer Screening: Mammogram  04/18/2025    Colorectal Cancer Screening  07/10/2026     Immunizations Due:      Topic Date Due    Pneumococcal Vaccine: 65+ Years (2 of 2 - PPSV23 or PCV20) 09/21/2021    COVID-19 Vaccine (4 - 2023-24 season) 09/01/2023    Influenza Vaccine (1) 09/01/2024     Advance Directives   What are advance directives?  Advance directives are legal documents that state your wishes and plans for medical care. These plans are made ahead of time in case you lose your ability to make decisions for yourself. Advance directives can apply to any medical decision, such as the treatments you want, and if you want to donate organs.   What are the types of advance directives?  There are many types of advance directives, and each state  has rules about how to use them. You may choose a combination of any of the following:  Living will:  This is a written record of the treatment you want. You can also choose which treatments you do not want, which to limit, and which to stop at a certain time. This includes surgery, medicine, IV fluid, and tube feedings.   Durable power of  for healthcare (DPAHC):  This is a written record that states who you want to make healthcare choices for you when you are unable to make them for yourself. This person, called a proxy, is usually a family member or a friend. You may choose more than 1 proxy.  Do not resuscitate (DNR) order:  A DNR order is used in case your heart stops beating or you stop breathing. It is a request not to have certain forms of treatment, such as CPR. A DNR order may be included in other types of advance directives.  Medical directive:  This covers the care that you want if you are in a coma, near death, or unable to make decisions for yourself. You can list the treatments you want for each condition. Treatment may include pain medicine, surgery, blood transfusions, dialysis, IV or tube feedings, and a ventilator (breathing machine).  Values history:  This document has questions about your views, beliefs, and how you feel and think about life. This information can help others choose the care that you would choose.  Why are advance directives important?  An advance directive helps you control your care. Although spoken wishes may be used, it is better to have your wishes written down. Spoken wishes can be misunderstood, or not followed. Treatments may be given even if you do not want them. An advance directive may make it easier for your family to make difficult choices about your care.   Weight Management   Why it is important to manage your weight:  Being overweight increases your risk of health conditions such as heart disease, high blood pressure, type 2 diabetes, and certain types of  cancer. It can also increase your risk for osteoarthritis, sleep apnea, and other respiratory problems. Aim for a slow, steady weight loss. Even a small amount of weight loss can lower your risk of health problems.  How to lose weight safely:  A safe and healthy way to lose weight is to eat fewer calories and get regular exercise. You can lose up about 1 pound a week by decreasing the number of calories you eat by 500 calories each day.   Healthy meal plan for weight management:  A healthy meal plan includes a variety of foods, contains fewer calories, and helps you stay healthy. A healthy meal plan includes the following:  Eat whole-grain foods more often.  A healthy meal plan should contain fiber. Fiber is the part of grains, fruits, and vegetables that is not broken down by your body. Whole-grain foods are healthy and provide extra fiber in your diet. Some examples of whole-grain foods are whole-wheat breads and pastas, oatmeal, brown rice, and bulgur.  Eat a variety of vegetables every day.  Include dark, leafy greens such as spinach, kale, jessica greens, and mustard greens. Eat yellow and orange vegetables such as carrots, sweet potatoes, and winter squash.   Eat a variety of fruits every day.  Choose fresh or canned fruit (canned in its own juice or light syrup) instead of juice. Fruit juice has very little or no fiber.  Eat low-fat dairy foods.  Drink fat-free (skim) milk or 1% milk. Eat fat-free yogurt and low-fat cottage cheese. Try low-fat cheeses such as mozzarella and other reduced-fat cheeses.  Choose meat and other protein foods that are low in fat.  Choose beans or other legumes such as split peas or lentils. Choose fish, skinless poultry (chicken or turkey), or lean cuts of red meat (beef or pork). Before you cook meat or poultry, cut off any visible fat.   Use less fat and oil.  Try baking foods instead of frying them. Add less fat, such as margarine, sour cream, regular salad dressing and  mayonnaise to foods. Eat fewer high-fat foods. Some examples of high-fat foods include french fries, doughnuts, ice cream, and cakes.  Eat fewer sweets.  Limit foods and drinks that are high in sugar. This includes candy, cookies, regular soda, and sweetened drinks.  Exercise:  Exercise at least 30 minutes per day on most days of the week. Some examples of exercise include walking, biking, dancing, and swimming. You can also fit in more physical activity by taking the stairs instead of the elevator or parking farther away from stores. Ask your healthcare provider about the best exercise plan for you.      © Copyright [employer] 2018 Information is for End User's use only and may not be sold, redistributed or otherwise used for commercial purposes. All illustrations and images included in CareNotes® are the copyrighted property of A.D.A.M., Inc. or [employer]

## 2024-07-10 NOTE — ASSESSMENT & PLAN NOTE
Exam suggestive of cyst -- will US to further evaluate. Currently not bothersome, but if becomes so or increases in size, can consider surgical removal

## 2024-07-10 NOTE — ASSESSMENT & PLAN NOTE
Somewhat decreased from last year, pt is working on lifestyle changes/weight loss. Reviewed ASCVD risk score, pt defers medication. Provided information on Mediterranean diet. Will repeat lipids in 6 months to monitor.

## 2024-07-24 ENCOUNTER — HOSPITAL ENCOUNTER (OUTPATIENT)
Dept: ULTRASOUND IMAGING | Facility: HOSPITAL | Age: 69
Discharge: HOME | End: 2024-07-24
Payer: MEDICARE

## 2024-07-24 DIAGNOSIS — R22.2 CHEST WALL MASS: ICD-10-CM

## 2024-07-24 PROCEDURE — 76705 ECHO EXAM OF ABDOMEN: CPT

## 2024-07-26 DIAGNOSIS — L72.0 EPIDERMAL CYST: Primary | ICD-10-CM

## 2024-07-29 ENCOUNTER — OFFICE VISIT (OUTPATIENT)
Dept: URGENT CARE | Facility: CLINIC | Age: 69
End: 2024-07-29
Payer: MEDICARE

## 2024-07-29 VITALS — HEART RATE: 86 BPM | RESPIRATION RATE: 18 BRPM | OXYGEN SATURATION: 99 % | TEMPERATURE: 98.6 F

## 2024-07-29 DIAGNOSIS — L02.213 ABSCESS OF CHEST WALL: Primary | ICD-10-CM

## 2024-07-29 PROCEDURE — 10060 I&D ABSCESS SIMPLE/SINGLE: CPT

## 2024-07-29 PROCEDURE — 87205 SMEAR GRAM STAIN: CPT

## 2024-07-29 PROCEDURE — G0463 HOSPITAL OUTPT CLINIC VISIT: HCPCS

## 2024-07-29 PROCEDURE — 87077 CULTURE AEROBIC IDENTIFY: CPT

## 2024-07-29 PROCEDURE — 87070 CULTURE OTHR SPECIMN AEROBIC: CPT

## 2024-07-29 PROCEDURE — 87147 CULTURE TYPE IMMUNOLOGIC: CPT

## 2024-07-29 PROCEDURE — 99213 OFFICE O/P EST LOW 20 MIN: CPT

## 2024-07-29 PROCEDURE — 87186 SC STD MICRODIL/AGAR DIL: CPT

## 2024-07-29 RX ORDER — CEPHALEXIN 500 MG/1
500 CAPSULE ORAL EVERY 8 HOURS SCHEDULED
Qty: 21 CAPSULE | Refills: 0 | Status: SHIPPED | OUTPATIENT
Start: 2024-07-29 | End: 2024-08-05

## 2024-07-29 NOTE — PATIENT INSTRUCTIONS
Take antibiotics as directed for next 7 days. Keep area covered with band-aid for 24 hours. May take tylenol/ibuprofen every 4-6 hours as needed for pain. Will follow-up with wound culture results if need to change antibiotic. Follow-up with PCP in 3-5 days if no improvement of symptoms. Report to ER if symptoms worsen or develop worsening pain, redness, or swelling around site of injury.

## 2024-08-04 LAB
BACTERIA WND AEROBE CULT: ABNORMAL
GRAM STN SPEC: ABNORMAL
GRAM STN SPEC: ABNORMAL

## 2024-08-09 ENCOUNTER — OFFICE VISIT (OUTPATIENT)
Dept: URGENT CARE | Facility: CLINIC | Age: 69
End: 2024-08-09
Payer: MEDICARE

## 2024-08-09 VITALS
TEMPERATURE: 98.4 F | OXYGEN SATURATION: 98 % | RESPIRATION RATE: 18 BRPM | HEART RATE: 85 BPM | SYSTOLIC BLOOD PRESSURE: 148 MMHG | DIASTOLIC BLOOD PRESSURE: 80 MMHG

## 2024-08-09 DIAGNOSIS — L72.0 EPIDERMOID CYST OF SKIN OF CHEST: Primary | ICD-10-CM

## 2024-08-09 PROCEDURE — G0463 HOSPITAL OUTPT CLINIC VISIT: HCPCS | Performed by: NURSE PRACTITIONER

## 2024-08-09 PROCEDURE — 99213 OFFICE O/P EST LOW 20 MIN: CPT | Performed by: NURSE PRACTITIONER

## 2024-08-09 NOTE — PATIENT INSTRUCTIONS
Continue to monitor cyst, if cyst becomes painful, has foul odor or has surrounding redness start abx ointment   Appointment scheduled with general surgery for cyst excision on 8/26  Warm compress PRN   F/u with PCP within 1-2 weeks  Proceed to ER should symptoms worsen

## 2024-08-09 NOTE — PROGRESS NOTES
Assessment/Plan    Epidermoid cyst of skin of chest [L72.0]  1. Epidermoid cyst of skin of chest  mupirocin (BACTROBAN) 2 % ointment        Continue to monitor cyst, if cyst becomes painful, has foul odor or has surrounding redness start abx ointment   Appointment scheduled with general surgery for cyst excision on 8/26  Warm compress PRN   F/u with PCP within 1-2 weeks  Proceed to ER should symptoms worsen      Patient ID: Priya Florez is a 69 y.o. female.      Reason For Visit / Chief Complaint  Chief Complaint   Patient presents with    Abcess     Requesting abscess to be drained again. States she finished abx on monday         68 y/o female presents for re-accumulation of left chest wall cyst that was recently drained at , patient was then given PO abx and completed the regimen. She had an US of the area that revealed an epidermal inclusion cyst. She is scheduled with general surgery for excision of said cyst on 8/26. She came to urgent care as she wanted to make sure there was nothing else that needed to be done with it given the re accumulation and her appointment being so far out. Denies fevers, chills, chest pain or tenderness.         History reviewed. No pertinent past medical history.    Past Surgical History:   Procedure Laterality Date    TONSILLECTOMY      TUBAL LIGATION         Family History   Problem Relation Age of Onset    Breast cancer Mother 40    Dementia Father     Stroke Sister     No Known Problems Sister     No Known Problems Sister     No Known Problems Maternal Grandmother     Prostate cancer Maternal Grandfather     No Known Problems Paternal Grandmother     Cancer Maternal Aunt        Review of Systems   Constitutional: Negative.    HENT: Negative.     Eyes: Negative.    Respiratory: Negative.     Cardiovascular: Negative.    Gastrointestinal: Negative.    Endocrine: Negative.    Genitourinary: Negative.    Musculoskeletal: Negative.    Skin:  Positive for rash.    Allergic/Immunologic: Negative.    Neurological: Negative.    Hematological: Negative.    Psychiatric/Behavioral: Negative.         Objective:    /80   Pulse 85   Temp 98.4 °F (36.9 °C)   Resp 18   SpO2 98%     Physical Exam  Constitutional:       Appearance: Normal appearance.   HENT:      Head: Normocephalic and atraumatic.      Right Ear: External ear normal.      Left Ear: External ear normal.      Nose: Nose normal.      Mouth/Throat:      Mouth: Mucous membranes are moist.      Pharynx: Oropharynx is clear.   Eyes:      Conjunctiva/sclera: Conjunctivae normal.   Cardiovascular:      Rate and Rhythm: Normal rate and regular rhythm.      Pulses: Normal pulses.      Heart sounds: Normal heart sounds.   Pulmonary:      Effort: Pulmonary effort is normal.      Breath sounds: Normal breath sounds.   Chest:          Comments: 5 mm pocket with pus accumulation. 1 cm movable cyst felt right under pocket with pus.   Abdominal:      Palpations: Abdomen is soft.   Musculoskeletal:         General: Normal range of motion.      Cervical back: Normal range of motion.   Skin:     General: Skin is warm and dry.      Capillary Refill: Capillary refill takes less than 2 seconds.   Neurological:      General: No focal deficit present.      Mental Status: She is alert and oriented to person, place, and time.   Psychiatric:         Behavior: Behavior normal.         Thought Content: Thought content normal.

## 2024-08-23 NOTE — PROGRESS NOTES
Ambulatory Visit  Name: Priya Florez      : 1955      MRN: [id number]  Encounter Provider: Estuardo Rojo MD  Encounter Date: 2024   Encounter department: [facility] SURGERY [place]    Assessment & Plan   1. Epidermoid cyst of skin of left breast  Assessment & Plan:  Patient is a pleasant 69-year-old female presenting with an epidermoid cyst of the left breast for definitive treatment by excisional biopsy.    Patient reports recent infection of the cyst.  She wishes to prevent this from happening again in the future.    The technical details of the procedure reviewed and informed verbal consent obtained to proceed.  2. Epidermal cyst  -     Ambulatory Referral to General Surgery      History of Present Illness     Priya Florez is a 69 y.o. female who presents for a Epidermal cyst that she has been dealing with for a year. The cyst inflamed into a abscess and was drainage by [facility] now on 24. She was placed on keflex. With the help of the procedure and abx the abscess healed. Patient denies drainage, pain or fevers/chills. Patient has an allergy to pistachio and meperidine. Patient is not on any blood thinners. SUZIE Hunter    Review of Systems   Constitutional:  Negative for chills and fever.   HENT:  Negative for ear pain and sore throat.    Eyes:  Negative for pain and visual disturbance.   Respiratory:  Negative for cough and shortness of breath.    Cardiovascular:  Negative for chest pain and palpitations.   Gastrointestinal:  Negative for abdominal pain and vomiting.   Genitourinary:  Negative for dysuria and hematuria.   Musculoskeletal:  Negative for arthralgias and back pain.   Skin:  Negative for color change and rash.   Neurological:  Negative for seizures and syncope.   All other systems reviewed and are negative.      Objective     /84 (BP Location: Left arm, Patient Position: Sitting, Cuff Size: Standard)   Pulse 89   Temp 98.8 °F (37.1 °C)  "(Temporal)   Ht 5' 3\" (1.6 m)   Wt 76.7 kg (169 lb)   SpO2 100%   BMI 29.94 kg/m²     Physical Exam  Vitals and nursing note reviewed.   Constitutional:       General: She is not in acute distress.     Appearance: She is well-developed.   HENT:      Head: Normocephalic and atraumatic.   Eyes:      Conjunctiva/sclera: Conjunctivae normal.   Cardiovascular:      Rate and Rhythm: Normal rate and regular rhythm.      Heart sounds: No murmur heard.  Pulmonary:      Effort: Pulmonary effort is normal. No respiratory distress.      Breath sounds: Normal breath sounds.   Abdominal:      Palpations: Abdomen is soft.      Tenderness: There is no abdominal tenderness.   Musculoskeletal:         General: No swelling.      Cervical back: Neck supple.   Skin:     General: Skin is warm and dry.      Capillary Refill: Capillary refill takes less than 2 seconds.      Comments: 13 x 8 mm epidermoid cyst left breast   Neurological:      Mental Status: She is alert.   Psychiatric:         Mood and Affect: Mood normal.       Administrative Statements       Skin excision    Date/Time: 8/26/2024 11:00 AM    Performed by: Estuardo Rojo MD  Authorized by: Estuardo Rojo MD  Universal Protocol:  Consent: Verbal consent obtained.  Risks and benefits: risks, benefits and alternatives were discussed  Consent given by: patient  Time out: Immediately prior to procedure a \"time out\" was called to verify the correct patient, procedure, equipment, support staff and site/side marked as required.  Timeout called at: 8/26/2024 11:27 AM.  Patient understanding: patient states understanding of the procedure being performed  Patient consent: the patient's understanding of the procedure matches consent given  Site marked: the operative site was marked  Patient identity confirmed: verbally with patient    Procedure Details - Skin Excision:     Number of Lesions:  1  Lesion 1:     Body area:  Trunk    Trunk location:  L breast    Malignancy: " benign lesion            Final defect size (mm):  10    Repair type:  Linear closure    Closure complexity: simple       Repair Comments: Procedure note: With informed verbal consent under sterile conditions using aseptic technique using 1% lidocaine local anesthesia with epinephrine and bicarbonate the left breast epidermoid cyst was sharply excised with a 15 blade and the wound closed primarily with vertical mattress sutures of 3-0 nylon

## 2024-08-26 ENCOUNTER — OFFICE VISIT (OUTPATIENT)
Dept: SURGERY | Facility: CLINIC | Age: 69
End: 2024-08-26
Payer: MEDICARE

## 2024-08-26 VITALS
WEIGHT: 169 LBS | SYSTOLIC BLOOD PRESSURE: 154 MMHG | HEART RATE: 89 BPM | DIASTOLIC BLOOD PRESSURE: 84 MMHG | BODY MASS INDEX: 29.95 KG/M2 | OXYGEN SATURATION: 100 % | HEIGHT: 63 IN | TEMPERATURE: 98.8 F

## 2024-08-26 DIAGNOSIS — L72.0 EPIDERMOID CYST OF SKIN OF LEFT BREAST: Primary | ICD-10-CM

## 2024-08-26 DIAGNOSIS — L72.0 EPIDERMAL CYST: ICD-10-CM

## 2024-08-26 PROCEDURE — 88304 TISSUE EXAM BY PATHOLOGIST: CPT | Performed by: PATHOLOGY

## 2024-08-26 PROCEDURE — 99203 OFFICE O/P NEW LOW 30 MIN: CPT | Performed by: SURGERY

## 2024-08-26 PROCEDURE — 11401 EXC TR-EXT B9+MARG 0.6-1 CM: CPT | Performed by: SURGERY

## 2024-08-26 NOTE — ASSESSMENT & PLAN NOTE
Patient is a pleasant 69-year-old female presenting with an epidermoid cyst of the left breast for definitive treatment by excisional biopsy.    Patient reports recent infection of the cyst.  She wishes to prevent this from happening again in the future.    The technical details of the procedure reviewed and informed verbal consent obtained to proceed.

## 2024-08-29 PROCEDURE — 88304 TISSUE EXAM BY PATHOLOGIST: CPT | Performed by: PATHOLOGY

## 2024-09-04 ENCOUNTER — TELEPHONE (OUTPATIENT)
Dept: SURGERY | Facility: CLINIC | Age: 69
End: 2024-09-04

## 2024-09-06 NOTE — PROGRESS NOTES
Ambulatory Visit  Name: Priya Florez      : 1955      MRN: [id number]  Encounter Provider: Estuardo Rooj MD  Encounter Date: 2024   Encounter department: [facility] GENERAL SURGERY [place]    Assessment & Plan   1. Epidermoid cyst of skin of left breast  Assessment & Plan:  Patient returns for routine scheduled follow-up regarding her sutures status post excision of a left breast epidermoid cyst.    Patient voiced no complaints referable to the wound.    On physical examination the surgical incision is clean dry and intact.  The sutures were removed and the wound dressed.    The pathology report reviewed with the patient and copy the report provided to the patient for her permanent record.    She has been encouraged to follow-up to practice at any point future with questions or concerns.      History of Present Illness     Priya Florez is a 69 y.o. female who presents suture removal, s/p exc of left chest cyst 2024. Pt states the incision is healed and denies drainage, pain or fevers/chills. SUZIE Hunter    Review of Systems   Constitutional:  Negative for chills and fever.   HENT:  Negative for ear pain and sore throat.    Eyes:  Negative for pain and visual disturbance.   Respiratory:  Negative for cough and shortness of breath.    Cardiovascular:  Negative for chest pain and palpitations.   Gastrointestinal:  Negative for abdominal pain and vomiting.   Genitourinary:  Negative for dysuria and hematuria.   Musculoskeletal:  Negative for arthralgias and back pain.   Skin:  Negative for color change and rash.   Neurological:  Negative for seizures and syncope.   All other systems reviewed and are negative.      Objective     Temp 97.5 °F (36.4 °C) (Temporal)     Physical Exam  Vitals and nursing note reviewed.   Constitutional:       General: She is not in acute distress.     Appearance: She is well-developed.   HENT:      Head: Normocephalic and atraumatic.   Eyes:       Conjunctiva/sclera: Conjunctivae normal.   Cardiovascular:      Rate and Rhythm: Normal rate and regular rhythm.      Heart sounds: No murmur heard.  Pulmonary:      Effort: Pulmonary effort is normal. No respiratory distress.      Breath sounds: Normal breath sounds.   Abdominal:      Palpations: Abdomen is soft.      Tenderness: There is no abdominal tenderness.   Musculoskeletal:         General: No swelling.      Cervical back: Neck supple.   Skin:     General: Skin is warm and dry.      Capillary Refill: Capillary refill takes less than 2 seconds.      Comments: Left wrist wound clean dry intact.  Sutures removed.  Wound dressed.   Neurological:      Mental Status: She is alert.   Psychiatric:         Mood and Affect: Mood normal.       Administrative Statements

## 2024-09-09 ENCOUNTER — OFFICE VISIT (OUTPATIENT)
Dept: SURGERY | Facility: CLINIC | Age: 69
End: 2024-09-09
Payer: MEDICARE

## 2024-09-09 VITALS — TEMPERATURE: 97.5 F

## 2024-09-09 DIAGNOSIS — L72.0 EPIDERMOID CYST OF SKIN OF LEFT BREAST: Primary | ICD-10-CM

## 2024-09-09 PROCEDURE — 99212 OFFICE O/P EST SF 10 MIN: CPT | Performed by: SURGERY

## 2024-09-09 NOTE — ASSESSMENT & PLAN NOTE
Patient returns for routine scheduled follow-up regarding her sutures status post excision of a left breast epidermoid cyst.    Patient voiced no complaints referable to the wound.    On physical examination the surgical incision is clean dry and intact.  The sutures were removed and the wound dressed.    The pathology report reviewed with the patient and copy the report provided to the patient for her permanent record.    She has been encouraged to follow-up to practice at any point future with questions or concerns.

## 2024-09-16 ENCOUNTER — OFFICE VISIT (OUTPATIENT)
Dept: NEUROLOGY | Facility: CLINIC | Age: 69
End: 2024-09-16
Payer: MEDICARE

## 2024-09-16 VITALS
DIASTOLIC BLOOD PRESSURE: 80 MMHG | SYSTOLIC BLOOD PRESSURE: 122 MMHG | HEIGHT: 63 IN | HEART RATE: 72 BPM | OXYGEN SATURATION: 96 % | WEIGHT: 167.6 LBS | BODY MASS INDEX: 29.7 KG/M2

## 2024-09-16 DIAGNOSIS — G20.A1 PARKINSON'S DISEASE WITHOUT DYSKINESIA, UNSPECIFIED WHETHER MANIFESTATIONS FLUCTUATE (HCC): Primary | ICD-10-CM

## 2024-09-16 PROCEDURE — 99214 OFFICE O/P EST MOD 30 MIN: CPT | Performed by: PSYCHIATRY & NEUROLOGY

## 2024-09-16 RX ORDER — CARBIDOPA AND LEVODOPA 25; 100 MG/1; MG/1
1 TABLET ORAL 3 TIMES DAILY
Qty: 270 TABLET | Refills: 1 | Status: SHIPPED | OUTPATIENT
Start: 2024-09-16

## 2024-09-16 NOTE — PROGRESS NOTES
Neurology Ambulatory Visit  Name: Priya Florez       : 1955       MRN: [id number]   Encounter Provider: Lauri Chin MD   Encounter Date: 2024  [facility]      Priya Florez is a 69 y.o. female.     Assessment:  Assessment & Plan  Parkinson's disease without dyskinesia, unspecified whether manifestations fluctuate    Orders:    carbidopa-levodopa (SINEMET)  mg per tablet; Take 1 tablet by mouth 3 (three) times a day    Patient seems to be doing well on Sinemet 25/100 mg 1 tablet 3 times a day and is not experiencing any side effects, advised her to continue with same, she is continuing with home physical therapy as she finished physical therapy and is not keen to go back, she was advised to take fall and safety precautions, to eat a healthy nutritious diet, to take a multivitamin every day, to keep her blood pressure cholesterol and sugar under control, if she has any grinding of the teeth or tension in the jaws that she is describing I advised her to discuss with family physician and see an ENT surgeon as I am not sure about the etiology of the symptoms.  To go to the hospital if has any worsening symptoms and call me otherwise to see me back in 6 months or sooner if needed and follow-up with the other physicians..          Subjective:  Chief Complaint   Patient presents with    Parkinson's Disease     Patient is here accompanied with her partner in follow-up for her freezing episodes and shuffling gait and Parkinson's tremor, she is accompanied with her  since her last visit she tells me that overall she is doing much better she has been taking Sinemet 25/100 mg 3 times a day and seems to be helping her tremor, she denies having any freezing episodes, she has finished physical therapy and continues to do physical therapy at home, and feels that that helped her, she has not had any falls, no bowel and bladder incontinence, no  "vision or speech difficulty, no dizziness, appetite is good, weight has been stable, sleep is good, she does complain of sometimes grinding of her teeth and tension in the lower jaws but is getting better and is not bothersome, no other complaints..    Prior Work-up:   CAT scan of the brain from 1/20/2024 shows an incidental 3.4 cm CSF density prominent extra-axial space over the left frontal vertex most compatible with an arachnoid cyst, there is mild mass effect upon subjacent gyri patient is not keen to have an MRI of the brain and is not keen to see a neurosurgeon    Vitals:    09/16/24 0950   BP: 122/80   BP Location: Left arm   Patient Position: Sitting   Cuff Size: Large   Pulse: 72   SpO2: 96%   Weight: 76 kg (167 lb 9.6 oz)   Height: 5' 3\" (1.6 m)       Current Medications    Current Outpatient Medications:     carbidopa-levodopa (SINEMET)  mg per tablet, Take 1 tablet by mouth 3 (three) times a day, Disp: 270 tablet, Rfl: 1    Cholecalciferol 100 MCG (4000 UT) CAPS, Take 4,000 mg by mouth daily, Disp: , Rfl:     [employer] FIBER GUMMIES PO, Take by mouth, Disp: , Rfl:     Cyanocobalamin (B-12 PO), Take by mouth in the morning, Disp: , Rfl:     Multiple Vitamins-Minerals (Centrum Silver 50+Women) TABS, , Disp: , Rfl:     mupirocin (BACTROBAN) 2 % ointment, Apply topically 2 (two) times a day (Patient not taking: Reported on 9/16/2024), Disp: 1 g, Rfl: 0      Allergies  Meperidine and Pistachio nut (diagnostic) - food allergy    Past Medical History  History reviewed. No pertinent past medical history.      Past Surgical History:  Past Surgical History:   Procedure Laterality Date    TONSILLECTOMY      TUBAL LIGATION           Family History:  Family History   Problem Relation Age of Onset    Breast cancer Mother 40    Dementia Father     Stroke Sister     No Known Problems Sister     No Known Problems Sister     No Known Problems Maternal Grandmother     Prostate cancer Maternal Grandfather     No Known " Problems Paternal Grandmother     Cancer Maternal Aunt        Social History:   reports that she has never smoked. She has never used smokeless tobacco. She reports that she does not currently use alcohol. She reports that she does not currently use drugs.    Objective:    Labs  I have reviewed pertinent labs:  CBC:   Lab Results   Component Value Date    WBC 6.62 06/28/2024    RBC 4.47 06/28/2024    HGB 14.1 06/28/2024    HCT 42.4 06/28/2024    MCV 95 06/28/2024     06/28/2024    MCH 31.5 06/28/2024    MCHC 33.3 06/28/2024    RDW 12.3 06/28/2024    MPV 10.0 06/28/2024    NEUTROABS 3.80 06/28/2024     CMP:   Lab Results   Component Value Date    SODIUM 140 06/28/2024    K 4.1 06/28/2024     06/28/2024    CO2 27 06/28/2024    AGAP 12 06/28/2024    BUN 12 06/28/2024    CREATININE 0.96 06/28/2024    GLUC 117 03/05/2024    GLUF 95 06/28/2024    CALCIUM 9.5 06/28/2024    AST 15 06/28/2024    ALT 5 (L) 06/28/2024    ALKPHOS 81 06/28/2024    TP 6.6 06/28/2024    ALB 4.3 06/28/2024    TBILI 0.62 06/28/2024    EGFR 60 06/28/2024              General Exam  GENERAL APPEARANCE:  No distress, alert, interactive and cooperative.  CARDIOVASCULAR: Warm and well perfused  LUNGS: normal work of breathing on room air  EXTREMITIES: no peripheral edema     Neurologic Exam  MENTAL STATE:  Orientation was normal to time, place and person. Recent and remote memory was intact.  Attention span and concentration were normal. Language testing was normal for comprehension, repetition, expression, and naming.      CRANIAL NERVES:  CN 2       visual fields full to confrontation.  CN 3, 4, 6  Pupils round, 4 mm in diameter, equally reactive to light. Lids symmetric; no ptosis. EOMs normal alignment, full range. No nystagmus.  CN 5  Facial sensation intact bilaterally.  CN 7  Normal and symmetric facial strength.   CN 8  Hearing intact to finger rub  CN 9  Palate elevates symmetrically.  CN 11  Normal strength of shoulder shrug and  neck turning.  CN 12  Tongue midline, with normal bulk and strength.     MOTOR:  Motor exam was normal. Muscle bulk and tone were normal in both upper and lower extremities. Muscle strength was 5/5 in distal and proximal muscles in both upper and lower extremities. No fasciculations noted, resting tremor of the left hand worse than the right hand     REFLEXES:  RIGHT UE   LEFT UE  BR:2              BR:2    Biceps:2      Biceps:2    Triceps:2     Triceps:2       RIGHT LLE   LEFT LLE    Knee:2           Knee:2    Ankle:2         Ankle:2    Babinski: toes downgoing to plantar stimulation. No clonus.     SENSORY:  Sensory exam was normal. In both upper and lower extremities, sensation was intact to light touch and vibration     COORDINATION:   Coordination exam was normal. In both upper extremities, finger-nose-finger was intact without dysmetria or overshoot. In both lower extremities, heel-to-shin was intact.     GAIT:  Ambulates with a decreased arm swing on the left, Romberg is negative, was able to do tandem walking.  She is able to get up from a sitting position without support, negative pull test.      ROS:  Review of Systems   Constitutional:  Negative for appetite change, fatigue and fever.   HENT: Negative.  Negative for hearing loss, tinnitus, trouble swallowing and voice change.    Eyes: Negative.  Negative for photophobia, pain and visual disturbance.   Respiratory: Negative.  Negative for shortness of breath.    Cardiovascular: Negative.  Negative for palpitations.   Gastrointestinal: Negative.  Negative for nausea and vomiting.   Endocrine: Negative.  Negative for cold intolerance.   Genitourinary: Negative.  Negative for dysuria, frequency and urgency.   Musculoskeletal:  Negative for back pain, gait problem, myalgias, neck pain and neck stiffness.   Skin: Negative.  Negative for rash.   Allergic/Immunologic: Negative.    Neurological:  Positive for tremors. Negative for dizziness, seizures, syncope,  facial asymmetry, speech difficulty, weakness, light-headedness, numbness and headaches.   Hematological: Negative.  Does not bruise/bleed easily.   Psychiatric/Behavioral: Negative.  Negative for confusion, hallucinations and sleep disturbance.            Lauri Chin MD

## 2025-02-17 ENCOUNTER — TELEPHONE (OUTPATIENT)
Dept: NEUROLOGY | Facility: CLINIC | Age: 70
End: 2025-02-17

## 2025-02-17 NOTE — TELEPHONE ENCOUNTER
Spoke with patient; rescheduled 4/25/25 appt as provider will not be in the office. Patient requested a little bit sooner appointment, I offered 4/11/25 @ 12pm [facility] (approved by Dr Chin).    Patient is requesting a refill of Carbidopa-Levodopa 3 month supply - sent to [facility] Mail order pharmacy, phone number [phone]. She said if there is any issues changing pharmacies please call her.

## 2025-02-18 NOTE — TELEPHONE ENCOUNTER
SOLEDAD Wall, do I need to send the refill for Sinemet?    Thank you,     Dr. Harrison MD.   02/18/25   10:04 AM

## 2025-02-24 DIAGNOSIS — G20.A1 PARKINSON'S DISEASE WITHOUT DYSKINESIA, UNSPECIFIED WHETHER MANIFESTATIONS FLUCTUATE (HCC): ICD-10-CM

## 2025-02-24 RX ORDER — CARBIDOPA AND LEVODOPA 25; 100 MG/1; MG/1
1 TABLET ORAL 3 TIMES DAILY
Qty: 270 TABLET | Refills: 1 | Status: SHIPPED | OUTPATIENT
Start: 2025-02-24 | End: 2025-03-05 | Stop reason: SDUPTHER

## 2025-03-05 DIAGNOSIS — G20.A1 PARKINSON'S DISEASE WITHOUT DYSKINESIA, UNSPECIFIED WHETHER MANIFESTATIONS FLUCTUATE (HCC): ICD-10-CM

## 2025-03-05 RX ORDER — CARBIDOPA AND LEVODOPA 25; 100 MG/1; MG/1
1 TABLET ORAL 3 TIMES DAILY
Qty: 270 TABLET | Refills: 1 | Status: SHIPPED | OUTPATIENT
Start: 2025-03-05

## 2025-03-05 NOTE — TELEPHONE ENCOUNTER
- Medication was sent to the wrong pharmacy    Reason for call:   [x] Refill   [] Prior Auth  [] Other:     Office:   [] PCP/Provider -   [x] Specialty/Provider - Neuro    Medication:   - Carbidopa-levodopa (Sinemet)  mg- take 1 tablet by mouth 3 times a day       Pharmacy: [facility]   Does the patient have enough for 3 days?   [] Yes   [] No - Send as HP to POD    Mail Away Pharmacy   Does the patient have enough for 10 days?   [x] Yes   [] No - Send as HP to POD

## 2025-03-11 ENCOUNTER — TELEPHONE (OUTPATIENT)
Dept: NEUROLOGY | Facility: CLINIC | Age: 70
End: 2025-03-11

## 2025-04-11 ENCOUNTER — OFFICE VISIT (OUTPATIENT)
Dept: NEUROLOGY | Facility: CLINIC | Age: 70
End: 2025-04-11
Payer: MEDICARE

## 2025-04-11 VITALS
SYSTOLIC BLOOD PRESSURE: 140 MMHG | WEIGHT: 167 LBS | DIASTOLIC BLOOD PRESSURE: 80 MMHG | OXYGEN SATURATION: 99 % | BODY MASS INDEX: 29.59 KG/M2 | HEART RATE: 71 BPM | HEIGHT: 63 IN

## 2025-04-11 DIAGNOSIS — G20.B1 PARKINSON'S DISEASE WITH DYSKINESIA WITHOUT FLUCTUATING MANIFESTATIONS (HCC): Primary | ICD-10-CM

## 2025-04-11 PROCEDURE — 99214 OFFICE O/P EST MOD 30 MIN: CPT | Performed by: PSYCHIATRY & NEUROLOGY

## 2025-04-11 NOTE — ASSESSMENT & PLAN NOTE
Orders:    Ambulatory Referral to Physical Therapy; Future    Ambulatory Referral to Occupational Therapy; Future    Ambulatory Referral to Neurology; Future

## 2025-04-11 NOTE — PROGRESS NOTES
Neurology Ambulatory Visit  Name: Priya Florez       : 1955       MRN: [id number]   Encounter Provider: Lauri Chin MD   Encounter Date: 2025  Encounter department: [facility]      Priya Florez is a 70 y.o. female.       Assessment & Plan  Parkinson's disease with dyskinesia without fluctuating manifestations (HCC)    Orders:    Ambulatory Referral to Physical Therapy; Future    Ambulatory Referral to Occupational Therapy; Future    Ambulatory Referral to Neurology; Future    Patient seems to be doing well on Sinemet 25/100 mg 1 tablet 3 times a day and is not experience any side effect she was advised to continue with same I did discuss with patient regarding adding rasagiline 1 mg a day she is going to read about this and would let me know.  Side effects discussed with her.  Patient was advised to see my colleague Dr. Tijerina movement disorder specialist to help with medical management, patient is agreeable    She was advised to go for physical therapy and Occupational Therapy and also was advised to continue with home exercise program to keep herself physically and mentally active to take a multivitamin every day, she does take turmeric and to eat a healthy nutritious diet, avoid eating red meat, to keep herself well-hydrated, to keep her blood pressure, cholesterol, sugar and weight under control to take fall and safety precautions to go to the hospital if has any worsening symptoms and call me otherwise to see me back in 6 months or sooner if needed and follow-up with the other physicians.    Subjective:  Chief Complaint   Patient presents with    Parkinson's disease without dyskinesia, unspecified whether       HISTORY OF PRESENT ILLNESS  Patient is here accompanied with her  in follow-up for her freezing episodes and resting tremor, since her last visit she tells me that overall she is doing much better she has been taking Sinemet 25/100 mg 1 tablet 3  times a day and seems to be helping her tremor she denies having any freezing episodes she continues to do home exercise program she had a slight fall when she took her shingles vaccine but she did not have any injuries there was no loss of consciousness no bowel and bladder incontinence no difficulty in swallowing she feels her walking has improved tremendously after the Sinemet her sleep is good mood is good appetite is good weight has been stable, no other complaints          Prior Work-up:   CAT scan of the brain from 1/20/2024 shows an incidental 3.4 cm CSF density prominent extra-axial space over the left frontal vertex most compatible with an arachnoid cyst there is mild mass effect upon subjacent gyri patient is not keen to have an MRI of the brain and is not keen to see a neurosurgeon.       Past Medical History:    History reviewed. No pertinent past medical history.  Past Surgical History:   Procedure Laterality Date    TONSILLECTOMY      TUBAL LIGATION         Family History:  Family History   Problem Relation Age of Onset    Breast cancer Mother 40    Dementia Father     Stroke Sister     No Known Problems Sister     No Known Problems Sister     No Known Problems Maternal Grandmother     Prostate cancer Maternal Grandfather     No Known Problems Paternal Grandmother     Cancer Maternal Aunt        Social History:  Social History     Tobacco Use    Smoking status: Never    Smokeless tobacco: Never   Vaping Use    Vaping status: Never Used   Substance Use Topics    Alcohol use: Not Currently    Drug use: Not Currently      Living situation:    Work:      Allergies:  Allergies   Allergen Reactions    Meperidine     Pistachio Nut (Diagnostic) - Food Allergy Hives       Medications:    Current Outpatient Medications:     carbidopa-levodopa (SINEMET)  mg per tablet, Take 1 tablet by mouth 3 (three) times a day, Disp: 270 tablet, Rfl: 1    Cholecalciferol 100 MCG (4000 UT) CAPS, Take 4,000 mg by mouth  "daily, Disp: , Rfl:     [employer] FIBER GUMMIES PO, Take by mouth, Disp: , Rfl:     Cyanocobalamin (B-12 PO), Take by mouth in the morning, Disp: , Rfl:     Multiple Vitamins-Minerals (Centrum Silver 50+Women) TABS, , Disp: , Rfl:     mupirocin (BACTROBAN) 2 % ointment, Apply topically 2 (two) times a day (Patient not taking: Reported on 9/16/2024), Disp: 1 g, Rfl: 0    Objective:  Vitals:    04/11/25 1153   BP: 140/80   BP Location: Left arm   Patient Position: Sitting   Cuff Size: Large   Pulse: 71   SpO2: 99%   Weight: 75.8 kg (167 lb)   Height: 5' 3\" (1.6 m)        Labs  I have reviewed pertinent labs:  CBC:   Lab Results   Component Value Date    WBC 6.62 06/28/2024    RBC 4.47 06/28/2024    HGB 14.1 06/28/2024    HCT 42.4 06/28/2024    MCV 95 06/28/2024     06/28/2024    MCH 31.5 06/28/2024    MCHC 33.3 06/28/2024    RDW 12.3 06/28/2024    MPV 10.0 06/28/2024    NEUTROABS 3.80 06/28/2024     CMP:   Lab Results   Component Value Date    SODIUM 140 06/28/2024    K 4.1 06/28/2024     06/28/2024    CO2 27 06/28/2024    AGAP 12 06/28/2024    BUN 12 06/28/2024    CREATININE 0.96 06/28/2024    GLUC 117 03/05/2024    GLUF 95 06/28/2024    CALCIUM 9.5 06/28/2024    AST 15 06/28/2024    ALT 5 (L) 06/28/2024    ALKPHOS 81 06/28/2024    TP 6.6 06/28/2024    ALB 4.3 06/28/2024    TBILI 0.62 06/28/2024    EGFR 60 06/28/2024     Thyroid Studies:   Lab Results   Component Value Date    JFA7ARAETVIU 1.821 12/07/2023     Hemoglobin A1C/EST AVG Glucose No results found for: \"HGBA1C\", \"EAG\"  Vitamin D Level   Lab Results   Component Value Date    NHJX74NIOHYZ 73.9 06/28/2023     Vitamin B12   Lab Results   Component Value Date    OKXEWUZC38 697 06/28/2024     Folate   Lab Results   Component Value Date    FOLATE >22.3 12/13/2023              General Exam  GENERAL APPEARANCE:  No distress, alert, interactive and cooperative.  CARDIOVASCULAR: Warm and well perfused  LUNGS: normal work of breathing on room air  EXTREMITIES: " no peripheral edema     Neurologic Exam  MENTAL STATE:  Orientation was normal to time, place and person without aphasia or apraxia. Recent and remote memory was intact.  Attention span and concentration were normal. Language testing was normal for comprehension, repetition, expression, and naming.     CRANIAL NERVES:  CN 2       visual fields full to confrontation.  CN 3, 4, 6  Pupils round, 4 mm in diameter, equally reactive to light. Lids symmetric; no ptosis. EOMs normal alignment, full range. No nystagmus.  CN 5  Facial sensation intact bilaterally.  CN 7  Normal and symmetric facial strength.   CN 8  Hearing intact to finger rub bilaterally.  CN 9  Palate elevates symmetrically.  CN 11  Normal strength of shoulder shrug and neck turning.  CN 12  Tongue midline, with normal bulk and strength.     MOTOR:  No drift noted in upper extremities strength is 5/5 proximally and distally patient does have cogwheeling rigidity in the left hand worse than the right hand and a resting tremor of the left hand  REFLEXES:  RIGHT UE   LEFT UE  BR:2              BR:2    Biceps:2      Biceps:2    Triceps:2     Triceps:2       RIGHT LLE   LEFT LLE    Knee:2           Knee:2    Ankle:2         Ankle:2           GAIT:  Ambulates with a slightly stooped posture and decreased arm swing on the left.  He is able to get up from a sitting position without support negative pull test      ROS:  Review of Systems   Constitutional:  Negative for appetite change, fatigue and fever.   HENT: Negative.  Negative for hearing loss, tinnitus, trouble swallowing and voice change.    Eyes: Negative.  Negative for photophobia, pain and visual disturbance.   Respiratory: Negative.  Negative for shortness of breath.    Cardiovascular: Negative.  Negative for palpitations.   Gastrointestinal: Negative.  Negative for nausea and vomiting.   Endocrine: Negative.  Negative for cold intolerance.   Genitourinary: Negative.  Negative for dysuria, frequency and  urgency.   Musculoskeletal:  Negative for back pain, gait problem, myalgias, neck pain and neck stiffness.   Skin: Negative.  Negative for rash.   Allergic/Immunologic: Negative.    Neurological:  Negative for dizziness, tremors, seizures, syncope, facial asymmetry, speech difficulty, weakness, light-headedness, numbness and headaches.   Hematological: Negative.  Does not bruise/bleed easily.   Psychiatric/Behavioral: Negative.  Negative for confusion, hallucinations and sleep disturbance.        I have reviewed the past medical history, surgical history, social and family history, current medications, allergies vitals, review of systems, and updated this information as appropriate today.    Administrative Statements   The total amount of time spent with the patient and on chart review and documentation was  20 minutes. Issues addressed during this clinic visit included overall management, medication counseling or monitoring, and counseling and coordination of care.         Lauri Chin MD

## 2025-04-21 ENCOUNTER — EVALUATION (OUTPATIENT)
Dept: PHYSICAL THERAPY | Facility: CLINIC | Age: 70
End: 2025-04-21
Payer: MEDICARE

## 2025-04-21 DIAGNOSIS — G20.B1 PARKINSON'S DISEASE WITH DYSKINESIA WITHOUT FLUCTUATING MANIFESTATIONS (HCC): ICD-10-CM

## 2025-04-21 PROCEDURE — 97162 PT EVAL MOD COMPLEX 30 MIN: CPT | Performed by: PHYSICAL THERAPIST

## 2025-04-21 PROCEDURE — 97112 NEUROMUSCULAR REEDUCATION: CPT | Performed by: PHYSICAL THERAPIST

## 2025-04-21 NOTE — PROGRESS NOTES
PT Evaluation     Today's date: 2025  Patient name: Priya Florez  : 1955  MRN: [id number]  Referring provider: Lauri Chin MD  Dx:   Encounter Diagnosis     ICD-10-CM    1. Parkinson's disease with dyskinesia without fluctuating manifestations (HCC)  G20.B1 Ambulatory Referral to Physical Therapy                     Assessment    Assessment details: Pt is a 70 y.o. female referred to OPPT for PD. Pt demo forward flexed posture and some minor balance deficits although she is not considered a fall risk. Overall she is doing well but could benefit from updated HEP. Pt would benefit from skilled PT to review LSVT-BIG program and then transition to HEP only.       Goals  ST weeks  Independent with HEP  Report no falls     LT weeks  Report that she can stand longer  Independent with updated HEP to self manage and maintain progress outside of PT      Plan    Planned therapy interventions: patient/caregiver education, neuromuscular re-education, balance, gait training, coordination, home exercise program, therapeutic activities, stretching, strengthening and therapeutic exercise    Frequency: 2x week  Duration in weeks: 4  Plan of Care beginning date: 2025  Plan of Care expiration date: 2025  Treatment plan discussed with: PTA and patient        Subjective Evaluation    History of Present Illness  Mechanism of injury: Was diagnosed with PD last year and went to PT in [place], and it made a huge difference. Was having a lot of freezing of gait but no longer having issues with that. Does HEP at home everyday. Is almost up to 1 mile of walking. Not sure why she was referred back to PT. Saw Neurology on 25. Takes Sinemet 3x/day. May just come for a few sessions. Is just stiff in the morning or when sitting too long.    Cannot stand for a long period of time - thinks that she may need to strengthen ankles. Does a lot of walking and arm exercises and has in ground pool.   Patient  Goals  Patient goal: stand longer  Pain  No pain reported    Social Support  Steps to enter house: yes  Stairs in house: yes   Lives in: multiple-level home  Lives with: spouse    Employment status: not working  Hand dominance: left          Objective      Balance Test    6 Minute Walk Test (ft):    Mini Best 29   FGA:    Gait Speed (m/s):    5x Sit To Stand (s): 11.8    TU.34  8.93 cog        Coordination Left Right   Heel To [place] WNL WNL   Finger To Nose     Rapid Alternating Movement     UE     LE WNL WNL       Sensation Left Right   Kinesthesia     Light Touch intact Intact    Sharp/Dull     2 Point Discrimination         - tested seated  Manual Muscle Testing - Hip Left Right   Flexion 4 4-   Extension     Abduction 4 4   External Rotation       Manual Muscle Testing - Knee Left Right   Flexion 4+ 4+   Extension 4+ 4+     Manual Muscle Testing - Ankle Left Right   Doriflexion 4 4   Plantarflexion                Insurance:   [employer] # of visits:  Expiration date:     FOTO: NA    Re-eval Date:     Date        Visit Count 1       FOTO        Pain In        Pain Out        Vitals             Precautions NA       Manuals                                        Neuro Re-Ed         LVST - BIG exercises   Floor to Ceiling  Side to Side   Forward step and reach  Backwards step and reach  Sideways step and reach   FW Rock and reach  Sideways rock and reach     Discussed LSVT-BIG program        STS        Hurdles                                                 Ther Ex        Nustep                                                                 Ther Activity                        Gait Training        Bw amb                 Modalities

## 2025-04-28 ENCOUNTER — OFFICE VISIT (OUTPATIENT)
Dept: PHYSICAL THERAPY | Facility: CLINIC | Age: 70
End: 2025-04-28
Attending: PSYCHIATRY & NEUROLOGY
Payer: MEDICARE

## 2025-04-28 DIAGNOSIS — G20.B1 PARKINSON'S DISEASE WITH DYSKINESIA WITHOUT FLUCTUATING MANIFESTATIONS (HCC): Primary | ICD-10-CM

## 2025-04-28 PROCEDURE — 97112 NEUROMUSCULAR REEDUCATION: CPT | Performed by: PHYSICAL THERAPIST

## 2025-04-28 NOTE — PROGRESS NOTES
Daily Note     Today's date: 2025  Patient name: Priya Florez  : 1955  MRN: [id number]  Referring provider: Lauri Chin MD  Dx:   Encounter Diagnosis     ICD-10-CM    1. Parkinson's disease with dyskinesia without fluctuating manifestations (HCC)  G20.B1                      Subjective: No new complaints.       Objective: See treatment diary below      Assessment: Initiated POC which pt tolerated wel,l but pt needed rest breaks in between each exercise due to fatigue. Overall demo good balance and did not have any major LOB. She did need minor shaping and VC for proper head and arm positioning. Patient would benefit from continued PT      Plan: Progress treatment as tolerated.       Insurance: [employer]  Auth # of visits:  Expiration date:     FOTO: NA    Re-eval Date:     Date       Visit Count 1 2      FOTO        Pain In        Pain Out        Vitals             Precautions NA       Manuals                                        Neuro Re-Ed         LVST - BIG exercises   Floor to Ceiling  Side to Side   Forward step and reach  Backwards step and reach  Sideways step and reach   FW Rock and reach  Sideways rock and reach     Discussed LSVT-BIG program  10x      STS  10x      Hurdles                                                 Ther Ex        Nustep   L3, 5 min for CV endurance    SPM > 50                                                               Ther Activity                        Gait Training        Bw amb         Big walking  Pt deferred      Modalities

## 2025-05-01 ENCOUNTER — OFFICE VISIT (OUTPATIENT)
Dept: PHYSICAL THERAPY | Facility: CLINIC | Age: 70
End: 2025-05-01
Attending: PSYCHIATRY & NEUROLOGY
Payer: MEDICARE

## 2025-05-01 DIAGNOSIS — G20.B1 PARKINSON'S DISEASE WITH DYSKINESIA WITHOUT FLUCTUATING MANIFESTATIONS (HCC): Primary | ICD-10-CM

## 2025-05-01 PROCEDURE — 97110 THERAPEUTIC EXERCISES: CPT | Performed by: PHYSICAL THERAPIST

## 2025-05-01 PROCEDURE — 97112 NEUROMUSCULAR REEDUCATION: CPT | Performed by: PHYSICAL THERAPIST

## 2025-05-01 NOTE — PROGRESS NOTES
Daily Note     Today's date: 2025  Patient name: Priya Florez  : 1955  MRN: [id number]  Referring provider: Lauri Chin MD  Dx:   Encounter Diagnosis     ICD-10-CM    1. Parkinson's disease with dyskinesia without fluctuating manifestations (HCC)  G20.B1                      Subjective: Felt tired after last session and have some questions on some of the MDEs.       Objective: See treatment diary below      Assessment: Less rest breaks needed today. Overall demo good balance but had a few LOB with backwards step and reach. Demo good big walking but fatigued easily and VC required to increase arm swing amplitude. Patient would benefit from continued PT      Plan: Progress treatment as tolerated.       Insurance: [employer] # of visits:  Expiration date:     FOTO: NA    Re-eval Date:     Date      Visit Count 1 2 3     FOTO        Pain In        Pain Out        Vitals             Precautions NA       Manuals                                        Neuro Re-Ed         LVST - BIG exercises   Floor to Ceiling  Side to Side   Forward step and reach  Backwards step and reach  Sideways step and reach   FW Rock and reach  Sideways rock and reach     Discussed LSVT-BIG program  10x 10x     STS  10x 10x     Hurdles                                                 Ther Ex        Nustep   L3, 5 min for CV endurance    SPM > 50  L4, 5 min for CV endurance    SPM > 50                                                              Ther Activity                        Gait Training        Bw amb         Big walking  Pt deferred Outside, 8 min      Modalities

## 2025-05-05 ENCOUNTER — OFFICE VISIT (OUTPATIENT)
Dept: PHYSICAL THERAPY | Facility: CLINIC | Age: 70
End: 2025-05-05
Attending: PSYCHIATRY & NEUROLOGY
Payer: MEDICARE

## 2025-05-05 ENCOUNTER — EVALUATION (OUTPATIENT)
Dept: OCCUPATIONAL THERAPY | Facility: CLINIC | Age: 70
End: 2025-05-05
Attending: PSYCHIATRY & NEUROLOGY
Payer: MEDICARE

## 2025-05-05 DIAGNOSIS — G20.B1 PARKINSON'S DISEASE WITH DYSKINESIA WITHOUT FLUCTUATING MANIFESTATIONS (HCC): Primary | ICD-10-CM

## 2025-05-05 PROCEDURE — 97166 OT EVAL MOD COMPLEX 45 MIN: CPT

## 2025-05-05 PROCEDURE — 97110 THERAPEUTIC EXERCISES: CPT | Performed by: PHYSICAL THERAPIST

## 2025-05-05 PROCEDURE — 97112 NEUROMUSCULAR REEDUCATION: CPT

## 2025-05-05 PROCEDURE — 97112 NEUROMUSCULAR REEDUCATION: CPT | Performed by: PHYSICAL THERAPIST

## 2025-05-05 NOTE — PROGRESS NOTES
Daily Note     Today's date: 2025  Patient name: Priya Florez  : 1955  MRN: [id number]  Referring provider: Lauri Chin MD  Dx:   Encounter Diagnosis     ICD-10-CM    1. Parkinson's disease with dyskinesia without fluctuating manifestations (HCC)  G20.B1                      Subjective: No new complaints.       Objective: See treatment diary below      Assessment: Tolerated treatment well and added flicks to MDE which she had more difficulty performing with L hand due to hx of trigger finger. Fatigued easily so rest breaks needed throughout out. Patient would benefit from continued PT      Plan: Progress treatment as tolerated.       Insurance: [employer] # of visits:  Expiration date:     FOTO: NA    Re-eval Date:     Date     Visit Count 1 2 3 4    FOTO        Pain In        Pain Out        Vitals             Precautions NA       Manuals                                        Neuro Re-Ed         LVST - BIG exercises   Floor to Ceiling  Side to Side   Forward step and reach  Backwards step and reach  Sideways step and reach   FW Rock and reach  Sideways rock and reach     Discussed LSVT-BIG program  10x 10x 10x with flicks     STS  10x 10x 10x     Hurdles                                                 Ther Ex        Nustep   L3, 5 min for CV endurance    SPM > 50  L4, 5 min for CV endurance    SPM > 50  L4, 10 min for CV endurance    SPM > 50                                                             Ther Activity                        Gait Training        Bw amb     30' x 2     Big walking  Pt deferred Outside, 8 min  200 ft x 2     Modalities

## 2025-05-05 NOTE — PROGRESS NOTES
OCCUPATIONAL THERAPY INITIAL EVALUATION        Visit/Unit Tracking  AUTH Status: Not Required Date               Visits  Authed: BOMN Used 1 (IE)                PLAN OF CARE START: 25  PLAN OF CARE END: 2025  PROGRESS NOTE DUE: 2025 (scheduled on calendar)   FREQUENCY: 1x week for 6 weeks   PRECAUTIONS: arthritis   DIAGNOSIS: PD (L resting tremor), trigger finger in L ring and L index   INSURANCE: Medicare/Medicaid       Today's date: 2025  Patient name: Priya Florez  : 1955  MRN: [id number]  Referring provider: Lauri Chin MD  Dx:   Encounter Diagnosis     ICD-10-CM    1. Parkinson's disease with dyskinesia without fluctuating manifestations (HCC)  G20.B1 Ambulatory Referral to Occupational Therapy            ASSESSMENT:     SKILLED ANALYSIS:  Pt is a L hand dominant 70 year old female presenting to OP OT s/p PD and trigger finger on L index and ring. Pt is independent with ADLs and IADLs. Pt reports tremor predominantly in L hand that worsens with stress. Additionally, pt reports trigger finger in L ring and decreased MCP extension. Pt reports difficulty with opening bottles/jars, handwriting, coordination/control, and functional grasp/stability in kitchen for cooking tasks (cutting). Enjoys [employer] as leisure activity. Fatigue when standing for long periods of time. Pt is demonstrating deficits based on clinical observation and the following assessments: trevor, 9-hole, FDT. Post assessments pt demonstrating the following impairments: handwriting, L hand trigger finger on index and ring, decreased L MCP extension, decreased motor control/coordination/dexterity, fatigue when standing, and L hand tremor. Pt presents with stooped posture. Recommend OP OT 1x/wk for 6 weeks with focus on aforementioned deficits to maximize functional performance and improve QOL. Findings and recommendations discussed with pt, and they are in agreement. Educated pt on charges of insurance,  "assessments performed with standardized norms, POC, goal creation, and OP OT services.     Subjective    SUBJECTIVE:     Occupational Profile  *Type of home:   *Lives with: partner  *Vocation: retired;    *Driving: partner  *DME: not currently used   *Assistive Devices: not currently used   *ADL status (bathing, dressing, transferring, continence, toileting, feeding): independent   *IADL status (cooking, cleaning, community mobility, medication management, finances, telephone, shopping, laundry): challenges with folding and functional bending; difficulty with handwriting; difficulty with stabilization during cooking tasks including chopping   *Leisure: currently takes part in [email or web address]       PATIENT GOAL: \"To not have these fingers lock up anymore\"    PAIN: 0    HISTORY OF PRESENT ILLNESS:   Pt is a 70 y.o. female who was referred to Occupational Therapy s/p PD and trigger finger on L index and ring. Pt has had PD for two years. Pt saw neurology on 4/11/25 with EMR reporting, \"Patient seems to be doing well on Sinemet 25/100 mg 1 tablet 3 times a day and is not experience any side effect she was advised to continue with same I did discuss with patient regarding adding rasagiline 1 mg a day she is going to read about this and would let me know.  Side effects discussed with her.  Patient was advised to see my colleague Dr. Tijerina movement disorder specialist to help with medical management, patient is agreeable. She was advised to go for physical therapy and Occupational Therapy and also was advised to continue with home exercise program to keep herself physically and mentally active to take a multivitamin every day, she does take turmeric and to eat a healthy nutritious diet, avoid eating red meat, to keep herself well-hydrated, to keep her blood pressure, cholesterol, sugar and weight under control to take fall and safety precautions to go to the hospital if has any worsening symptoms and call me " "otherwise to see me back in 6 months or sooner if needed and follow-up with the other physicians.\"         PMH: No past medical history on file.    Past Surgical Hx:   Past Surgical History:   Procedure Laterality Date    TONSILLECTOMY      TUBAL LIGATION            Objective    OBJECTIVE:     Impairment Observations:    R UE 5/5 L UE 5/5 Comments            Strength   Impaired Impaired Dominant Hand: L   Dynamometer    - Gross Grasp   50 lbs 40 lbs    Pinch Meter     - Pincer   5 lbs 5 lbs     - Tripod   9 lbs 6 lbs     - Lateral   10 lbs  9 lbs          AROM     R UE 5/5 L UE 5/5     Impaired Impaired    Pronation WFL  WFL     Supination 80  85     Wrist Flex 60  55     Wrist Ext 70  45     Digit Flex Full Full    Digit Ex Full  Limited at MCPs    Pinky: -35  Ring: -40  Middle: -30  Index: -25            COORDINATION       RUE: 5/5 LUE: 5/5    Opposition WFL WFL    9 Hole Peg Test-  assesses dexterity/fine motor coordination    19 seconds 24 seconds Pt demonstrates decreased FMC compared to norms for age/sex    Fxnl Dexterity Test-assesses patient's ability to use the hand for daily tasks requiring a 3-jaw corinna prehension between the fingers and the thumb     27 seconds  Compensates 75% with board 52 seconds compensates with 100% of board  Pt demonstrates decreased dexterity compared to norms for age/sex        *PHYSICAL PERFORMANCE TEST (PERFORMED WITH L UE): TEST NEXT RE  “The PPT assesses multiple domains of physical function using observed performance of tasks that stimulate activities of daily living of various degrees of difficulty in populations including older adults, Parkinson's Disease, non-specific populations and mixed populations.”  NORMS:   7 ITEM TEST:   -21-28 INDEPENDENT; 3-15 DEPENDENT   AGE 60-69 70-79 80-89    MALE 26 24.6 20.4 16.5   FEMALE 26.4 25.1 19.5 16.2   DEVICE   16.1 14.4     ACTION SCORING  STATUS ON IE (time and score):   COMMENTS   Sentence writing  <10 sec = 4   10.5 - " 15 sec = 3   15.5 - 20 sec =   2 > 20 sec =   1 unable = 0     Simulated eating  <10 sec = 4   10.5 - 15 sec = 3   15.5 - 20 sec = 2   > 20 sec =   1 unable = 0     Lift book and place on shelf  <= 2 sec = 4   2.5 - 4 sec = 3   4.5 - 6 sec = 2   > 6 sec = 1   unable = 0     Put on and remove jacket  <10 sec = 4   10.5 -15 sec = 3   15.5 - 20 sec = 2   > 20 sec = 1   unable = 0      oliver from floor  <2 sec = 4   2.5 - 4 sec = 3   4.5 - 6 sec = 2   > 6 sec = 1   unable = 0     Turn 360 degrees Discontinuous steps = 0 Continuous steps = 2 Unsteady (grabs, staggers) = 0   Steady = 2     50 foot walk test, start sitting  <= 15 sec = 4   15.5 - 20 sec = 3   20.5 - 25 sec = 2   > 25 sec = 1   unable = 0     TOTAL SCORE (round to nearest .5 seconds for timed measures)   /28          INTERVENTIONS:   POWR/LSVT BIG  Large ambulatory motions  Standing balance/endurance  Handwriting Tasks   Coordination/Control  Dexterity  Tremor Stabilization  Adaptive Equipment  Strength   Intrinsic Strengthening/stretching   Postural control/stability   [facility]  HEP      GOALS:     STGS:    STGs:    Pt will have increased L UE MCP digit extension by 10 degrees for increased ROM for functional grasp for ADLs/IADLs    Pt will experience 50% decrease in catching on L digits for increased functional grasp during IADLS and 50% decrease in drops.     Pt will demo sustained functional grasp of 5-10lb items with no drops reported to perfrom cooking tasks.     Pt will increase LUE  strength by 5lbs for increased functional grasp strength for ADLs/IADLs    Pt will demo increased coordination/control with functional grasp during writing tasks with 50% increase in legibility of writing compared to previous writing samples for increased ability to sign her name.           LTGS:     Pt will demonstrate good carry over of stabilization techniques (resting affected arm on stable surfaces) to increase participation in functional table top  IADLs    Pt will utilize adaptive writing tools and techniques to increase writing legibility during IADL writing tasks and increase in independence with financial management     Pt will increase proprioception of LUE hand to target for improved functional reach vision occluded with ADL/IADL tasks  x 80% accuracy      Pt will demonstrate improved in hand manipulation skills as evidenced by performing functional dexterity test with L UE in (-10) seconds with 50% compensation in order to increase independence with utensil management and IADL performance

## 2025-05-08 ENCOUNTER — OFFICE VISIT (OUTPATIENT)
Dept: PHYSICAL THERAPY | Facility: CLINIC | Age: 70
End: 2025-05-08
Attending: PSYCHIATRY & NEUROLOGY
Payer: MEDICARE

## 2025-05-08 DIAGNOSIS — G20.B1 PARKINSON'S DISEASE WITH DYSKINESIA WITHOUT FLUCTUATING MANIFESTATIONS (HCC): Primary | ICD-10-CM

## 2025-05-08 PROCEDURE — 97110 THERAPEUTIC EXERCISES: CPT | Performed by: PHYSICAL THERAPIST

## 2025-05-08 PROCEDURE — 97112 NEUROMUSCULAR REEDUCATION: CPT | Performed by: PHYSICAL THERAPIST

## 2025-05-08 NOTE — PROGRESS NOTES
Daily Note     Today's date: 2025  Patient name: Priya Florez  : 1955  MRN: [id number]  Referring provider: Lauri Chin MD  Dx:   Encounter Diagnosis     ICD-10-CM    1. Parkinson's disease with dyskinesia without fluctuating manifestations (HCC)  G20.B1                      Subjective: No new complaints.       Objective: See treatment diary below      Assessment: Progressed MDE by adding foam which pt tolerated well but initially very fearful with first few reps of each exercise. Overall demo fair balance but lacks confidence in balance. Patient would benefit from continued PT      Plan: Progress treatment as tolerated.       Insurance: [employer] # of visits:  Expiration date:     FOTO: NA    Re-eval Date:     Date    Visit Count 1 2 3 4 5   FOTO        Pain In        Pain Out        Vitals             Precautions NA       Manuals                                        Neuro Re-Ed         LVST - BIG exercises   Floor to Ceiling  Side to Side   Forward step and reach  Backwards step and reach  Sideways step and reach   FW Rock and reach  Sideways rock and reach     Discussed LSVT-BIG program  10x 10x 10x with flicks  10x with flicks and foam    STS  10x 10x 10x  10x 2 foam    Hurdles                                                 Ther Ex        Nustep   L3, 5 min for CV endurance    SPM > 50  L4, 5 min for CV endurance    SPM > 50  L4, 10 min for CV endurance    SPM > 50  L4, 10 min for CV endurance    SPM > 50                                                            Ther Activity                        Gait Training        Bw amb     30' x 2     Big walking  Pt deferred Outside, 8 min  200 ft x 2  3 laps hallway   Modalities

## 2025-05-12 ENCOUNTER — OFFICE VISIT (OUTPATIENT)
Dept: OCCUPATIONAL THERAPY | Facility: CLINIC | Age: 70
End: 2025-05-12
Attending: PSYCHIATRY & NEUROLOGY
Payer: MEDICARE

## 2025-05-12 DIAGNOSIS — G20.B1 PARKINSON'S DISEASE WITH DYSKINESIA WITHOUT FLUCTUATING MANIFESTATIONS (HCC): Primary | ICD-10-CM

## 2025-05-12 PROCEDURE — 97530 THERAPEUTIC ACTIVITIES: CPT

## 2025-05-12 PROCEDURE — 97110 THERAPEUTIC EXERCISES: CPT

## 2025-05-12 NOTE — PROGRESS NOTES
Daily Note     Today's date: 2025  Patient name: Priya Florez  : 1955  MRN: [id number]  Referring provider: Lauri Chin MD  Dx:   Encounter Diagnosis     ICD-10-CM    1. Parkinson's disease with dyskinesia without fluctuating manifestations (HCC)  G20.B1                    Visit/Unit Tracking  AUTH Status: Not Required Date              Visits  Authed: BOMN Used 1 (IE) 2               PLAN OF CARE START: 25  PLAN OF CARE END: 2025  PROGRESS NOTE DUE: 2025 (scheduled on calendar)   FREQUENCY: 1x week for 6 weeks   PRECAUTIONS: arthritis   DIAGNOSIS: PD (L resting tremor), trigger finger in L ring and L index   INSURANCE: Medicare/Medicaid     Subjective: Im going to switch over to this hand, just to keep it even.       Objective: See treatment diary below. Pt engaged in skilled OT this date with focus to hand strength, ROM, HEP development, tremor management, and overall activity tolerance, for increased safety and engagement in ADL/IADL tasks.     Assessment: Tolerated treatment well. Therapist educating pt on grasping items with wide  and getting around object, digit extension stretches, and manipulating objects instead of putting them down, pt with verbal understanding. Therapist instructing pt to bring home set to therapy to review hand strengthening kit she has. Patient exhibited good technique with therapeutic exercises and would benefit from continued OT      Plan: Continue per plan of care.  Progress treatment as tolerated.   Progress treament per protocol.      Manuals                                                                          Neuro Re-Ed                                          Ther Ex            Tennis Ball beads Opposition in squeeze out b/l           Digit Extension Red RB  X 20 b/l           ROM  Digit Abduction  Digit Taps  Digit Opposition     X 20  X 20  X 20           Digiflex Red   X 20           Tension Pin All poms in bowl            Digit Extension Stretches  20 sec x each digit b/l HEP                                   Ther Activity 5/12           Education on HEP            Small Blocks A-Z manipulate small blocks                                                  Modalities 5/12           Ultrasound            E-STIM w/ MH            CP            MH            Ice Massage

## 2025-05-13 ENCOUNTER — OFFICE VISIT (OUTPATIENT)
Dept: PHYSICAL THERAPY | Facility: CLINIC | Age: 70
End: 2025-05-13
Attending: PSYCHIATRY & NEUROLOGY
Payer: MEDICARE

## 2025-05-13 DIAGNOSIS — G20.B1 PARKINSON'S DISEASE WITH DYSKINESIA WITHOUT FLUCTUATING MANIFESTATIONS (HCC): Primary | ICD-10-CM

## 2025-05-13 PROCEDURE — 97110 THERAPEUTIC EXERCISES: CPT | Performed by: PHYSICAL THERAPIST

## 2025-05-13 PROCEDURE — 97112 NEUROMUSCULAR REEDUCATION: CPT | Performed by: PHYSICAL THERAPIST

## 2025-05-13 NOTE — PROGRESS NOTES
Daily Note     Today's date: 2025  Patient name: Priya Florez  : 1955  MRN: [id number]  Referring provider: Lauri Chin MD  Dx:   Encounter Diagnosis     ICD-10-CM    1. Parkinson's disease with dyskinesia without fluctuating manifestations (HCC)  G20.B1                      Subjective: Overdid it with the foam. Was hurting over the weekend and could not do her exercises. Does not want to do all MDE with foam.       Objective: See treatment diary below      Assessment: Pt deferred completing all MDE with foam so only performed some MDE with foam today. Overall demo good balance but pt very hesitant and fearful with MDE on foam. Patient would benefit from continued PT      Plan: Progress treatment as tolerated.       Insurance: [employer] # of visits:  Expiration date:     FOTO: NA    Re-eval Date:     Date    Visit Count 6  3 4 5   FOTO        Pain In        Pain Out        Vitals             Precautions NA       Manuals                                        Neuro Re-Ed         LVST - BIG exercises   Floor to Ceiling  Side to Side   Forward step and reach  Backwards step and reach  Sideways step and reach   FW Rock and reach  Sideways rock and reach     10x with flicks and foam with fw step and reach, sideways step and reach, and rock and reach    10x 10x with flicks  10x with flicks and foam    STS 10 x foam   10x 10x  10x 2 foam    Hurdles  Fw/lat 4 laps                                                Ther Ex        Nustep  L4, 10 min for CV endurance    SPM > 50   L4, 5 min for CV endurance    SPM > 50  L4, 10 min for CV endurance    SPM > 50  L4, 10 min for CV endurance    SPM > 50                                                            Ther Activity                        Gait Training        Bw amb     30' x 2     Big walking 400ft   Outside, 8 min  200 ft x 2  3 laps hallway   Modalities

## 2025-05-19 ENCOUNTER — APPOINTMENT (OUTPATIENT)
Dept: PHYSICAL THERAPY | Facility: CLINIC | Age: 70
End: 2025-05-19
Attending: PSYCHIATRY & NEUROLOGY
Payer: MEDICARE

## 2025-05-19 DIAGNOSIS — G20.B1 PARKINSON'S DISEASE WITH DYSKINESIA WITHOUT FLUCTUATING MANIFESTATIONS (HCC): Primary | ICD-10-CM

## 2025-05-19 PROCEDURE — 97112 NEUROMUSCULAR REEDUCATION: CPT

## 2025-05-19 PROCEDURE — 97110 THERAPEUTIC EXERCISES: CPT

## 2025-05-19 NOTE — PROGRESS NOTES
Daily Note     Today's date: 2025  Patient name: Priya Florez  : 1955  MRN: [id number]  Referring provider: Lauri Chin MD  Dx:   Encounter Diagnosis     ICD-10-CM    1. Parkinson's disease with dyskinesia without fluctuating manifestations (HCC)  G20.B1                      Subjective: No new co's      Objective: See treatment diary below      Assessment: Tolerated treatment well.  Pt provided mirror/verbal cuing with neuro exer completed this date   1* cues for step stride, BL UE reach, voice boosts  Patient would benefit from continued PT      Plan: Continue per plan of care.      Insurance: [employer] # of visits:  Expiration date:     FOTO: NA    Re-eval Date:     Date       Visit Count 6 7/10      FOTO        Pain In        Pain Out        Vitals             Precautions NA       Manuals                                        Neuro Re-Ed         LVST - BIG exercises   Floor to Ceiling  Side to Side   Forward step and reach  Backwards step and reach  Sideways step and reach   FW Rock and reach  Sideways rock and reach     10x with flicks and foam with fw step and reach, sideways step and reach, and rock and reach   10x with flicks and foam with fw step and reach, sideways step and reach, and rock and reach        STS 10 x foam  10 x foam       Hurdles  Fw/lat 4 laps  Resume NV                                              Ther Ex        Nustep  L4, 10 min for CV endurance    SPM > 50  L4, 10 min for CV endurance    SPM > 50                                                               Ther Activity                        Gait Training        Bw amb         Big walking 400ft        Modalities

## 2025-05-21 ENCOUNTER — APPOINTMENT (OUTPATIENT)
Dept: PHYSICAL THERAPY | Facility: CLINIC | Age: 70
End: 2025-05-21
Attending: PSYCHIATRY & NEUROLOGY
Payer: MEDICARE

## 2025-05-21 ENCOUNTER — OFFICE VISIT (OUTPATIENT)
Dept: OCCUPATIONAL THERAPY | Facility: CLINIC | Age: 70
End: 2025-05-21
Attending: PSYCHIATRY & NEUROLOGY
Payer: MEDICARE

## 2025-05-21 DIAGNOSIS — G20.B1 PARKINSON'S DISEASE WITH DYSKINESIA WITHOUT FLUCTUATING MANIFESTATIONS (HCC): Primary | ICD-10-CM

## 2025-05-21 PROCEDURE — 97112 NEUROMUSCULAR REEDUCATION: CPT

## 2025-05-21 PROCEDURE — 97110 THERAPEUTIC EXERCISES: CPT

## 2025-05-21 NOTE — PROGRESS NOTES
"Daily Note     Today's date: 2025  Patient name: Priya Florez  : 1955  MRN: [id number]  Referring provider: Lauri Chin MD  Dx:   Encounter Diagnosis     ICD-10-CM    1. Parkinson's disease with dyskinesia without fluctuating manifestations (HCC)  G20.B1                    Visit/Unit Tracking  AUTH Status: Not Required Date             Visits  Authed: BOMN Used 1 (IE) 2 3              PLAN OF CARE START: 25  PLAN OF CARE END: 2025  PROGRESS NOTE DUE: 2025 (scheduled on )   FREQUENCY: 1x week for 6 weeks   PRECAUTIONS: arthritis   DIAGNOSIS: PD (L resting tremor), trigger finger in L ring and L index   INSURANCE: Medicare/Medicaid     Subjective: \"I just don't understand why I'm doing this, I do this in PT I don't see why I need to do this in OT.\"      Objective: See treatment diary below. Pt engaged in skilled OT this date with focus to hand strength, ROM, HEP development, tremor management,  strength/endurance, and overall activity tolerance, for increased safety and engagement in ADL/IADL tasks.     Assessment: Tolerated treatment well. Pt was frustrated with activities,commenting on feeling like a kid, however after educating, pt was receptive to exercises. Pt demonstrated fair endurance with dynamic reaching activity. Pt is eager to perform handwriting. Patient exhibited good technique with therapeutic exercises and would benefit from continued OT      Plan: Continue per plan of care.  Progress treatment as tolerated.   Progress treament per protocol.      Manuals                                                                        Neuro Re-Ed            Functional reaching   Dynamic movement reaching task with pegs b/l D1 & D2 movements w/ side step and cross body reach and removal with up and down reaching                       Ther Ex          Tennis Ball beads Opposition in squeeze out b/l           Digit Extension " Red RB  X 20 b/l  Blue L 2   x 20 b/l         ROM  Digit Abduction  Digit Taps  Digit Opposition     X 20  X 20  X 20           Digiflex Red   X 20           Tension Pin All poms in bowl           Digit Extension Stretches  20 sec x each digit b/l HEP          Thera Putty   Green, roll, pinch (opposition), RD/UD coil          Foam Cubes   B/l intrinsic squeeze tool to  an place in bin 1 x 1         Ther Activity 5/12 5/21         Education on HEP  Educated on digit extensor, gripper, ball, ring excersises  - too hard in black color only use 1 out of group         Small Blocks A-Z manipulate small blocks                                              Modalities 5/12 5/21         Ultrasound            E-STIM w/ MH            CP            MH            Ice Massage               Session and note reviewed by Shital GRIFFIN/L prior to signing.

## 2025-05-22 ENCOUNTER — APPOINTMENT (OUTPATIENT)
Dept: OCCUPATIONAL THERAPY | Facility: CLINIC | Age: 70
End: 2025-05-22
Attending: PSYCHIATRY & NEUROLOGY
Payer: MEDICARE

## 2025-05-22 ENCOUNTER — PATIENT MESSAGE (OUTPATIENT)
Dept: FAMILY MEDICINE CLINIC | Facility: CLINIC | Age: 70
End: 2025-05-22

## 2025-05-22 DIAGNOSIS — M79.642 LEFT HAND PAIN: Primary | ICD-10-CM

## 2025-05-23 NOTE — PATIENT COMMUNICATION
Patient is requesting a script for OT for her left hand.  She would like for it to take place in the [place] office.    Please advise, thank you.

## 2025-05-28 ENCOUNTER — APPOINTMENT (OUTPATIENT)
Dept: OCCUPATIONAL THERAPY | Facility: CLINIC | Age: 70
End: 2025-05-28
Attending: PSYCHIATRY & NEUROLOGY
Payer: MEDICARE

## 2025-05-29 ENCOUNTER — OFFICE VISIT (OUTPATIENT)
Dept: PHYSICAL THERAPY | Facility: CLINIC | Age: 70
End: 2025-05-29
Attending: PSYCHIATRY & NEUROLOGY
Payer: MEDICARE

## 2025-05-29 DIAGNOSIS — G20.B1 PARKINSON'S DISEASE WITH DYSKINESIA WITHOUT FLUCTUATING MANIFESTATIONS (HCC): Primary | ICD-10-CM

## 2025-05-29 PROCEDURE — 97112 NEUROMUSCULAR REEDUCATION: CPT | Performed by: PHYSICAL THERAPIST

## 2025-05-29 PROCEDURE — 97110 THERAPEUTIC EXERCISES: CPT | Performed by: PHYSICAL THERAPIST

## 2025-05-29 NOTE — PROGRESS NOTES
Daily Note     Today's date: 2025  Patient name: Priya Florez  : 1955  MRN: [id number]  Referring provider: Lauri Chin MD  Dx:   Encounter Diagnosis     ICD-10-CM    1. Parkinson's disease with dyskinesia without fluctuating manifestations (HCC)  G20.B1                      Subjective: No new complaints.       Objective: See treatment diary below      Assessment: Completed POC without incident. She lacked confidence in balance but was able to complete MDE with foam with minimal LOB. Patient would benefit from continued PT      Plan: Progress treatment as tolerated.       Insurance: [email or web address]  Auth # of visits:  Expiration date:     FOTO: NA    Re-eval Date:     Date      Visit Count 6 7/10 8/10     FOTO        Pain In        Pain Out        Vitals             Precautions NA       Manuals                                        Neuro Re-Ed         LVST - BIG exercises   Floor to Ceiling  Side to Side   Forward step and reach  Backwards step and reach  Sideways step and reach   FW Rock and reach  Sideways rock and reach     10x with flicks and foam with fw step and reach, sideways step and reach, and rock and reach   10x with flicks and foam with fw step and reach, sideways step and reach, and rock and reach   10x with flicks and foam with fw step and reach, sideways step and reach, and rock and reach      STS 10 x foam  10 x foam  10x foam      Hurdles  Fw/lat 4 laps  Resume NV                                              Ther Ex        Nustep  L4, 10 min for CV endurance    SPM > 50  L4, 10 min for CV endurance    SPM > 50  L4, 10 min for CV endurance    SPM > 50                                                              Ther Activity                        Gait Training        Bw amb         Big walking 400ft    5 min outside      Modalities

## 2025-06-03 ENCOUNTER — EVALUATION (OUTPATIENT)
Dept: PHYSICAL THERAPY | Facility: CLINIC | Age: 70
End: 2025-06-03
Attending: PSYCHIATRY & NEUROLOGY
Payer: MEDICARE

## 2025-06-03 DIAGNOSIS — G20.B1 PARKINSON'S DISEASE WITH DYSKINESIA WITHOUT FLUCTUATING MANIFESTATIONS (HCC): Primary | ICD-10-CM

## 2025-06-03 PROCEDURE — 97110 THERAPEUTIC EXERCISES: CPT | Performed by: PHYSICAL THERAPIST

## 2025-06-03 PROCEDURE — 97112 NEUROMUSCULAR REEDUCATION: CPT | Performed by: PHYSICAL THERAPIST

## 2025-06-03 NOTE — PROGRESS NOTES
Progress Note     Today's date: 6/3/2025  Patient name: Priya Florez  : 1955  MRN: [id number]  Referring provider: Lauri Chin MD  Dx:   Encounter Diagnosis     ICD-10-CM    1. Parkinson's disease with dyskinesia without fluctuating manifestations (HCC)  G20.B1                      Subjective: Can stand much longer and consciously standing taller.     Patient Goals  Patient goal: stand longer  Pain  No pain reported    Objective: See treatment diary below      Assessment: PN completed this session. She had subjective report of significant improvement in standing tolerance and posture. Compared to IE, she demo significant improvement in balance per FGA. At this time, she has made good progress with PT and is independent with HEP so she will be d/c from skilled PT.       Goals  ST weeks  Independent with HEP - met  Report no falls - met     LT weeks  Report that she can stand longer - met  Independent with updated HEP to self manage and maintain progress outside of PT - met        Plan - Discharge     Planned therapy interventions: patient/caregiver education, neuromuscular re-education, balance, gait training, coordination, home exercise program, therapeutic activities, stretching, strengthening and therapeutic exercise     Plan of Care beginning date: 2025  Plan of Care expiration date: 2025  Treatment plan discussed with: PTA and patient      Balance Test 4/21 6/3   6 Minute Walk Test (ft):      Mini Best 29    FGA:    Gait Speed (m/s):      5x Sit To Stand (s): 11.8     TU.34  8.93 cog         - tested seated  Manual Muscle Testing - Hip Left Right   Flexion 4+ 4+   Extension       Abduction 4+ 4+   External Rotation          Manual Muscle Testing - Knee Left Right   Flexion 4+ 4+   Extension 4+ 4+      Manual Muscle Testing - Ankle Left Right   Doriflexion 4+ 4+   Plantarflexion                Insurance:   Auth # of visits:  Expiration date:     FOTO:  NA    Re-eval Date: 5/21    Date 5/13 5/19 5/29 6/3    Visit Count 6 7/10 8/10 9/10 PN     FOTO        Pain In        Pain Out        Vitals             Precautions NA       Manuals                                        Neuro Re-Ed         LVST - BIG exercises   Floor to Ceiling  Side to Side   Forward step and reach  Backwards step and reach  Sideways step and reach   FW Rock and reach  Sideways rock and reach     10x with flicks and foam with fw step and reach, sideways step and reach, and rock and reach   10x with flicks and foam with fw step and reach, sideways step and reach, and rock and reach   10x with flicks and foam with fw step and reach, sideways step and reach, and rock and reach  10x flicks     STS 10 x foam  10 x foam  10x foam  2 x 10     Hurdles  Fw/lat 4 laps  Resume NV                                              Ther Ex        Nustep  L4, 10 min for CV endurance    SPM > 50  L4, 10 min for CV endurance    SPM > 50  L4, 10 min for CV endurance    SPM > 50  L4, 10 min for CV endurance    SPM > 50                                                             Ther Activity                        Gait Training        Bw amb         Big walking 400ft    5 min outside      Modalities

## 2025-06-05 ENCOUNTER — EVALUATION (OUTPATIENT)
Dept: OCCUPATIONAL THERAPY | Facility: CLINIC | Age: 70
End: 2025-06-05
Attending: FAMILY MEDICINE
Payer: MEDICARE

## 2025-06-05 DIAGNOSIS — M19.042 ARTHRITIS OF LEFT HAND: Primary | ICD-10-CM

## 2025-06-05 PROCEDURE — 97110 THERAPEUTIC EXERCISES: CPT

## 2025-06-05 PROCEDURE — 97165 OT EVAL LOW COMPLEX 30 MIN: CPT

## 2025-06-05 PROCEDURE — 97530 THERAPEUTIC ACTIVITIES: CPT

## 2025-06-05 NOTE — PROGRESS NOTES
OT Evaluation     Today's date: 2025  Patient name: Priya Florez  : 1955  MRN: [id number]  Referring provider: Chetna Catalan DO  Dx:   Encounter Diagnosis     ICD-10-CM    1. Arthritis of left hand  M19.042           Start Time: 1645  Stop Time: 1735  Total time in clinic (min): 50 minutes    Assessment  Impairments: abnormal coordination, abnormal movement, activity intolerance, impaired physical strength, lacks appropriate home exercise program, unable to perform ADL, activity limitations and endurance  Functional limitations: difficulty with activities such as handwriting due to decreased coordination, weakness, resting tremor, and arthritic changes in hand  Symptom irritability: moderate    Assessment details: Priya Florez is a 70 y.o., Left HD female referred to hand therapy for L hand arthritis.  Onset of symptoms in  due to unknown etiology but patient experiences locking in LRF due to a hyperextension at her PIP joint and mild swan neck deformity. No active triggering observed or palpable nodules at A1 pulleys.  Patient presents 25 with impaired ROM, strength, and coordination of the L hand.  Deficits also noted in functional use of the left hand especially with handwriting. Patient provided with initial HEP focused on TGEs, and  and pinch strengthening. She was also educated on adaptive equipment to improve function with handwriting. Patient is a good candidate for OT services to restore strength, coordination, and function for a return to independence in daily tasks.   Understanding of Dx/Px/POC: excellent     Prognosis: good    Goals  STGs (2-3 weeks)  Patient will be independent in implementing HEP prescribed by therapist  Patient will demonstrate 5# improvement in L  strength for improved use of L hand in IADLs  Patient will demonstrate 2-3# improvement in L pinch strength for improved use of L hand in handwriting  Patient will demonstrate independence with  "adaptive equipment to improve function with handwriting  LTGs (4-6 weeks)  Patient will demonstrate independence in a HEP to maintain ROM, strength, and function at discharge  Patient will demonstrate 10# improvement in L  strength for improved use of L hand in IADLs  Patient will demonstrate 4-5# improvement in L pinch strength for improved use of L hand in handwriting  Patient will achieve goals as demonstrated by FOTO results  Patient will be independent in managing symptoms through HEP and adaptive equipment    Plan  Patient would benefit from: skilled occupational therapy and home program  Planned modality interventions: thermotherapy: hydrocollator packs    Planned therapy interventions: joint mobilization, neuromuscular re-education, strengthening, stretching, therapeutic activities, therapeutic exercise, home exercise program, graded exercise, graded activity, functional ROM exercises, flexibility, fine motor coordination training, coordination, activity modification, manual therapy, orthotic fitting/training and orthotic management and training    Frequency: 1-2x week  Duration in weeks: 6  Plan of Care beginning date: 2025  Plan of Care expiration date: 2025  Treatment plan discussed with: patient        Subjective Evaluation    History of Present Illness  Mechanism of injury: Priya Florez is a 70 y.o. female referred to OT for L hand arthritis. Patient reports triggering in her LRF in . She reports locking in her LMF started recently. She is having trouble with handwriting and reports overall weakness in her L hand. She is also having difficulty with fine motor tasks. She is not having any pain in her hand.  Patient Goals  Patient goal: \"Get more functionality out of the hand and see if I can improve handwriting\"  Pain  Current pain ratin  At best pain ratin  At worst pain ratin  Progression: worsening    Social Support  Lives in: multiple-level home  Lives with: " spouse    Employment status: not working (retired )  Hand dominance: left      Diagnostic Tests  X-ray: abnormal (12/7/23: unremarkable)  Treatments  Previous treatment: occupational therapy  Current treatment: physical therapy and occupational therapy        Objective     Observations     Left Wrist/Hand   Positive for deformity and ulnar drift. Negative for trigger finger.     Additional Observation Details  No active triggering or palpable nodule at LRF or LMF A1 pulley. Mild swan neck deformity in LRF. Ulnar drift at MP joints    Tenderness     Additional Tenderness Details  No tenderness L hand    Neurological Testing     Additional Neurological Details  Resting tremor in bilateral hands due to Parkinson's    Active Range of Motion     Left Wrist   Normal active range of motion    Left Thumb   Palmar Abduction     CMC: 30 degrees  Radial abduction    CMC: 32 degrees    Opposition: Opposition WNLs    Right Thumb   Palmar Abduction    CMC: 41  Radial Abduction    CMC: 40    Additional Active Range of Motion Details  Loose composite fist L hand    Strength/Myotome Testing     Left Wrist/Hand   Wrist extension: 5  Wrist flexion: 5  Radial deviation: 5  Ulnar deviation: 5     (2nd hand position)     Trial 1: 43.9    Thumb Strength  Key/Lateral Pinch     Trial 1: 10  Palmar/Three-Point Pinch     Trial 1: 9    Right Wrist/Hand      (2nd hand position)     Trial 1: 37.6    Thumb Strength   Key/Lateral Pinch     Trial 1: 11  Palmar/Three-Point Pinch     Trial 1: 11.5    Swelling     Left Wrist/Hand   Circumference MCP: 18.2 cm    Right Wrist/Hand   Circumference MCP: 18.2 cm             Precautions: Parkinson's   Access Code: [email or web address]  POC expires Unit limit Auth  expiration date PT/OT + Visit Limit?   7/17/25 12/31 BOMN                 Visit/Unit Tracking  AUTH Status:  Date 6/5 IE              BOMN Used 1               Remaining                     Manuals 6/5 IE        Jt mobs MP joints                 "                    Neuro Re-Ed                                    Ortho fit/train         Oval 8 for swan neck LRF                           Ther Ex         TGEs x10         strength YTP x10        Pinch strength YTP x10        MP ext stretch on table 10 x 10\"          TB control                                    Ther Activity         Adaptive equipment Review options for built up and weighted pens        FMC, translation         Towel scrunch with          HEP                           Modalities         MHP  5'                         "

## 2025-06-09 ENCOUNTER — OFFICE VISIT (OUTPATIENT)
Dept: OCCUPATIONAL THERAPY | Facility: CLINIC | Age: 70
End: 2025-06-09
Attending: FAMILY MEDICINE
Payer: MEDICARE

## 2025-06-09 DIAGNOSIS — M19.042 ARTHRITIS OF LEFT HAND: Primary | ICD-10-CM

## 2025-06-09 DIAGNOSIS — G20.B1 PARKINSON'S DISEASE WITH DYSKINESIA WITHOUT FLUCTUATING MANIFESTATIONS (HCC): ICD-10-CM

## 2025-06-09 PROCEDURE — 97530 THERAPEUTIC ACTIVITIES: CPT

## 2025-06-09 PROCEDURE — 97760 ORTHOTIC MGMT&TRAING 1ST ENC: CPT

## 2025-06-09 PROCEDURE — 97110 THERAPEUTIC EXERCISES: CPT

## 2025-06-09 NOTE — PROGRESS NOTES
"Daily Note     Today's date: 2025  Patient name: Priya Florez  : 1955  MRN: [id number]  Referring provider: Chetna Catalan DO  Dx:   Encounter Diagnosis     ICD-10-CM    1. Arthritis of left hand  M19.042       2. Parkinson's disease with dyskinesia without fluctuating manifestations (HCC)  G20.B1           Start Time: 1215  Stop Time: 1300  Total time in clinic (min): 45 minutes    Subjective: Patient reports she ordered a weighted and thick pen and has had great results with improved handwriting      Objective: See treatment diary below  9 hole peg test  R: 24 seconds  L: 24 seconds      Assessment: Tolerated treatment well. Patient exhibited good technique with therapeutic exercises and would benefit from continued OT. Fabricated and issued custom oval 8 orthosis to correct hyperextension at LRF PIP joint. Patient able to independently don and doff splint is able to move through full ROM without any clicking or locking sensation in this area.      Plan: Continue per plan of care.      Precautions: Parkinson's   Access Code: [email or web address]  POC expires Unit limit Auth  expiration date PT/OT + Visit Limit?   25 BOMN                 Visit/Unit Tracking  AUTH Status:  Date  IE              BOMN Used 1 2              Remaining                     Manuals  IE        Jt mobs MP joints                                    Neuro Re-Ed                                    Ortho fit/train         Oval 8 for swan neck LRF  15' use as needed for improved hand function with grasping                         Ther Ex         TGEs x10 x10        strength YTP x10        Pinch strength YTP x10 G CP up/down bars x2 1 set 3 pt, 1 set key       MP ext stretch on table 10 x 10\"          TB control  X3 rounds                                  Ther Activity         Adaptive equipment Review options for built up and weighted pens        FMC, translation  Buttons 1 set       Towel scrunch with   10 x " "3\"       HEP                           Modalities         P  5' 5'                        "

## 2025-06-12 ENCOUNTER — OFFICE VISIT (OUTPATIENT)
Dept: OCCUPATIONAL THERAPY | Facility: CLINIC | Age: 70
End: 2025-06-12
Attending: FAMILY MEDICINE
Payer: MEDICARE

## 2025-06-12 DIAGNOSIS — M19.042 ARTHRITIS OF LEFT HAND: Primary | ICD-10-CM

## 2025-06-12 DIAGNOSIS — G20.B1 PARKINSON'S DISEASE WITH DYSKINESIA WITHOUT FLUCTUATING MANIFESTATIONS (HCC): ICD-10-CM

## 2025-06-12 PROCEDURE — 97110 THERAPEUTIC EXERCISES: CPT

## 2025-06-17 ENCOUNTER — OFFICE VISIT (OUTPATIENT)
Dept: OCCUPATIONAL THERAPY | Facility: CLINIC | Age: 70
End: 2025-06-17
Attending: FAMILY MEDICINE
Payer: MEDICARE

## 2025-06-17 DIAGNOSIS — M19.042 ARTHRITIS OF LEFT HAND: Primary | ICD-10-CM

## 2025-06-17 DIAGNOSIS — G20.B1 PARKINSON'S DISEASE WITH DYSKINESIA WITHOUT FLUCTUATING MANIFESTATIONS (HCC): ICD-10-CM

## 2025-06-17 PROCEDURE — 97140 MANUAL THERAPY 1/> REGIONS: CPT

## 2025-06-17 PROCEDURE — 97530 THERAPEUTIC ACTIVITIES: CPT

## 2025-06-17 PROCEDURE — 97110 THERAPEUTIC EXERCISES: CPT

## 2025-06-17 NOTE — PROGRESS NOTES
"Daily Note     Today's date: 2025  Patient name: Priya Florez  : 1955  MRN: [id number]  Referring provider: Chetna Catalan DO  Dx:   Encounter Diagnosis     ICD-10-CM    1. Arthritis of left hand  M19.042       2. Parkinson's disease with dyskinesia without fluctuating manifestations (HCC)  G20.B1           Start Time: 1215  Stop Time: 1300  Total time in clinic (min): 45 minutes    Subjective: Patient continues to notice improved function with her handwriting in her R hand      Objective: See treatment diary below      Assessment: Tolerated treatment well. Patient exhibited good technique with therapeutic exercises and would benefit from continued OT. Fabricated and issued oval 8 for LMF to prevent hyperextension of PIP joint and associated clicking and locking. Patient performed exercises in clinic with splints donned with minimal episodes of locking in LMF PIP joint.      Plan: Continue per plan of care.      Precautions: Parkinson's   Access Code: [email or web address]  POC expires Unit limit Auth  expiration date PT/OT + Visit Limit?   25 BOMN                 Visit/Unit Tracking  AUTH Status:  Date  IE            BOMN Used 1 2 3 4            Remaining                     Manuals  IE      Jt mobs MP joints                                    Neuro Re-Ed                                    Ortho fit/train         Oval 8 for swan neck LRF  15' use as needed for improved hand function with grasping  Oval 8 for PIP hyperextension LMF (billed under manual)                       Ther Ex         TGEs x10 x10        strength YTP x10  HG 55# foam cubes 1 set HG 55# foam cubes 1 set     Pinch strength YTP x10 G CP up/down bars x2 1 set 3 pt, 1 set key G CP up/down bars x2 1 set 3 pt, 1 set key    Velcro toys x2 rounds B CP up/down bars x2 1 set 3 pt, 1 set key    Velcro toys x2 sets     MP ext stretch on table 10 x 10\"          TB control  X3 rounds X3 rounds X3 rounds   " "  RB spreads   Ext x3 and place on ball Ext x3 and place and ball     Intrinsic strength    OEW abd 3x10              Ther Activity         Adaptive equipment Review options for built up and weighted pens        FMC, translation  Buttons 1 set Buttons 1 set  Golf balls 2' Golf balls 3'  Key pegs 1 set     Towel scrunch with   10 x 3\"       HEP                           Modalities         MHP  5' 5' 5' 5'                          "

## 2025-06-24 ENCOUNTER — OFFICE VISIT (OUTPATIENT)
Dept: OCCUPATIONAL THERAPY | Facility: CLINIC | Age: 70
End: 2025-06-24
Attending: FAMILY MEDICINE
Payer: MEDICARE

## 2025-06-24 DIAGNOSIS — M19.042 ARTHRITIS OF LEFT HAND: Primary | ICD-10-CM

## 2025-06-24 DIAGNOSIS — G20.B1 PARKINSON'S DISEASE WITH DYSKINESIA WITHOUT FLUCTUATING MANIFESTATIONS (HCC): ICD-10-CM

## 2025-06-24 PROCEDURE — 97110 THERAPEUTIC EXERCISES: CPT

## 2025-06-24 NOTE — PROGRESS NOTES
OT Discharge    Today's date: 2025  Patient name: Priya Florez  : 1955  MRN: [id number]  Referring provider: Chetna Catalan DO  Dx:   Encounter Diagnosis     ICD-10-CM    1. Arthritis of left hand  M19.042       2. Parkinson's disease with dyskinesia without fluctuating manifestations (HCC)  G20.B1           Start Time: 1215  Stop Time: 1240  Total time in clinic (min): 25 minutes    Subjective: Patient requests to make today's appointment her last as she has been independently performing HEP and is having minimal issues or difficulty with her L hand.      Objective: See treatment diary below   (2nd hand position)     Trial 1: 43.9     Thumb Strength  Key/Lateral Pinch     Trial 1: 11 (+1)  Palmar/Three-Point Pinch     Trial 1: 10 (+1)      Assessment: Tolerated treatment well. Patient overall is doing well and independent with HEP and use of adaptive equipment to improve her strength and overall hand function. She has not demonstrated improvement in  strength but has minimal functional deficits with her L hand. She will be transitioned to HEP today.  Goals  STGs (2-3 weeks)  Patient will be independent in implementing HEP prescribed by therapist MET  Patient will demonstrate 5# improvement in L  strength for improved use of L hand in IADLs NOT MET  Patient will demonstrate 2-3# improvement in L pinch strength for improved use of L hand in handwriting PROGRESSING  Patient will demonstrate independence with adaptive equipment to improve function with handwriting MET  LTGs (4-6 weeks)  Patient will demonstrate independence in a HEP to maintain ROM, strength, and function at discharge MET  Patient will demonstrate 10# improvement in L  strength for improved use of L hand in IADLs NOT MET  Patient will demonstrate 4-5# improvement in L pinch strength for improved use of L hand in handwriting NOT MET  Patient will be independent in managing symptoms through HEP and adaptive equipment  "MET      Plan: Patient discharged to HEP today. She was encouraged to contact office with any questions or concerns.     Precautions: Parkinson's   Access Code: [email or web address]  POC expires Unit limit Auth  expiration date PT/OT + Visit Limit?   7/17/25 12/31 BOMN                 Visit/Unit Tracking  AUTH Status:  Date 6/5 IE 6/9 6/12 6/17 6/24          BOMN Used 1 2 3 4 5           Remaining                     Manuals 6/5 IE 6/9 6/12 6/17 6/24    Jt mobs MP joints                                    Neuro Re-Ed                                    Ortho fit/train         Oval 8 for swan neck LRF  15' use as needed for improved hand function with grasping  Oval 8 for PIP hyperextension LMF (billed under manual)                       Ther Ex         TGEs x10 x10        strength YTP x10  HG 55# foam cubes 1 set HG 55# foam cubes 1 set     Pinch strength YTP x10 G CP up/down bars x2 1 set 3 pt, 1 set key G CP up/down bars x2 1 set 3 pt, 1 set key    Velcro toys x2 rounds B CP up/down bars x2 1 set 3 pt, 1 set key    Velcro toys x2 sets     MP ext stretch on table 10 x 10\"          TB control  X3 rounds X3 rounds X3 rounds X3 rounds    RB spreads   Ext x3 and place on ball Ext x3 and place and ball     Intrinsic strength    OEW abd 3x10     Re-eval     Updated measurements for  and pinch    Ther Activity         Adaptive equipment Review options for built up and weighted pens        FMC, translation  Buttons 1 set Buttons 1 set  Golf balls 2' Golf balls 3'  Key pegs 1 set     Towel scrunch with   10 x 3\"       HEP                           Modalities         MHP  5' 5' 5' 5' 5'                           "

## 2025-07-08 ENCOUNTER — RA CDI HCC (OUTPATIENT)
Dept: OTHER | Facility: HOSPITAL | Age: 70
End: 2025-07-08

## 2025-07-14 ENCOUNTER — OFFICE VISIT (OUTPATIENT)
Dept: FAMILY MEDICINE CLINIC | Facility: CLINIC | Age: 70
End: 2025-07-14
Payer: MEDICARE

## 2025-07-14 VITALS
SYSTOLIC BLOOD PRESSURE: 138 MMHG | BODY MASS INDEX: 29.77 KG/M2 | DIASTOLIC BLOOD PRESSURE: 82 MMHG | WEIGHT: 168 LBS | OXYGEN SATURATION: 97 % | TEMPERATURE: 97.5 F | HEIGHT: 63 IN | HEART RATE: 80 BPM

## 2025-07-14 DIAGNOSIS — Z12.31 VISIT FOR SCREENING MAMMOGRAM: ICD-10-CM

## 2025-07-14 DIAGNOSIS — G20.B1 PARKINSON'S DISEASE WITH DYSKINESIA WITHOUT FLUCTUATING MANIFESTATIONS (HCC): ICD-10-CM

## 2025-07-14 DIAGNOSIS — E78.00 PURE HYPERCHOLESTEROLEMIA: ICD-10-CM

## 2025-07-14 DIAGNOSIS — Z00.00 MEDICARE ANNUAL WELLNESS VISIT, SUBSEQUENT: Primary | ICD-10-CM

## 2025-07-14 PROCEDURE — G0439 PPPS, SUBSEQ VISIT: HCPCS | Performed by: FAMILY MEDICINE

## 2025-07-14 NOTE — PROGRESS NOTES
"Name: Priay Florez      : 1955      MRN: [id number]  Encounter Provider: Chetna Catalan DO  Encounter Date: 2025   Encounter department: [facility]  :  Assessment & Plan  Medicare annual wellness visit, subsequent         Pure hypercholesterolemia    Orders:  •  CBC and differential; Future  •  Comprehensive metabolic panel; Future  •  Lipid panel; Future    Parkinson's disease with dyskinesia without fluctuating manifestations (HCC)  F/u with Neuro as scheduled, continue Sinemet        Visit for screening mammogram    Orders:  •  Mammo screening bilateral w 3d and cad; Future       Preventive health issues were discussed with patient, and age appropriate screening tests were ordered as noted in patient's After Visit Summary. Personalized health advice and appropriate referrals for health education or preventive services given if needed, as noted in patient's After Visit Summary.    History of Present Illness     HPI     Pt presents for AWV     Continues to follow with Neuro, PT/OT for Parkinson's     Patient Care Team:  Chetna Catalan DO as PCP - General (Family Medicine)  Chetna Catalan DO (Family Medicine)    Review of Systems   Constitutional:  Negative for unexpected weight change.   HENT:  Negative for congestion, ear pain, rhinorrhea and sore throat.    Eyes:  Negative for visual disturbance (had an occular migraine recently (very mild)).   Respiratory:  Negative for cough and shortness of breath.    Cardiovascular:  Negative for chest pain, palpitations and leg swelling.   Gastrointestinal:  Negative for abdominal pain, blood in stool, constipation and diarrhea.   Endocrine: Negative for polyuria.   Genitourinary:  Negative for dysuria and hematuria.   Neurological:  Positive for tremors (chronic). Negative for dizziness and headaches. Syncope: after shinlges #2. Weakness: had an episode of her RUE \"flopping\" when trying to scratch her noise -- no other symptoms " associated; hasn't recurred.  Psychiatric/Behavioral:  Negative for sleep disturbance.      Medical History Reviewed by provider this encounter:  Tobacco  Allergies  Meds  Problems  Med Hx  Surg Hx  Fam Hx       Annual Wellness Visit Questionnaire   Priya is here for her Subsequent Wellness visit.     Health Risk Assessment:   Patient rates overall health as good. Patient feels that their physical health rating is same. Patient is satisfied with their life. Eyesight was rated as slightly worse. Hearing was rated as same. Patient feels that their emotional and mental health rating is same. Patients states they are never, rarely angry. Patient states they are never, rarely unusually tired/fatigued. Pain experienced in the last 7 days has been none. Patient states that she has experienced no weight loss or gain in last 6 months.     Depression Screening:   PHQ-2 Score: 0      Fall Risk Screening:   In the past year, patient has experienced: no history of falling in past year      Urinary Incontinence Screening:   Patient has not leaked urine accidently in the last six months.     Home Safety:  Patient does not have trouble with stairs inside or outside of their home. Patient has working smoke alarms and has working carbon monoxide detector. Home safety hazards include: none.     Nutrition:   Current diet is Low Cholesterol, Low Saturated Fat and No Added Salt.     Medications:   Patient is currently taking over-the-counter supplements. OTC medications include: see medication list. Patient is able to manage medications.     Activities of Daily Living (ADLs)/Instrumental Activities of Daily Living (IADLs):   Walk and transfer into and out of bed and chair?: Yes  Dress and groom yourself?: Yes    Bathe or shower yourself?: Yes    Feed yourself? Yes  Do your laundry/housekeeping?: Yes  Manage your money, pay your bills and track your expenses?: Yes  Make your own meals?: Yes    Do your own shopping?:  Yes    Durable Medical Equipment Suppliers  N/A    Previous Hospitalizations:   Any hospitalizations or ED visits within the last 12 months?: No      Advance Care Planning:   Living will: No    Durable POA for healthcare: No    Advanced directive: No      Cognitive Screening:   Provider or family/friend/caregiver concerned regarding cognition?: No    Preventive Screenings      Cardiovascular Screening:    General: Screening Not Indicated and History Lipid Disorder    Due for: Lipid Panel      Diabetes Screening:       Due for: Blood Glucose      Colorectal Cancer Screening:     General: Screening Current      Breast Cancer Screening:     General: Screening Current    Due for: Mammogram        Cervical Cancer Screening:    General: Screening Not Indicated      Osteoporosis Screening:    General: Patient Declines    Due for: DXA Appendicular      Abdominal Aortic Aneurysm (AAA) Screening:        General: Screening Not Indicated      Lung Cancer Screening:     General: Screening Not Indicated      Hepatitis C Screening:    General: Patient Declines    Hep C Screening Accepted: No     Screening, Brief Intervention, and Referral to Treatment (SBIRT)     Screening  Typical number of drinks in a day: 0  Typical number of drinks in a week: 0  Interpretation: Low risk drinking behavior.    AUDIT-C Screenin) How often did you have a drink containing alcohol in the past year? never  2) How many drinks did you have on a typical day when you were drinking in the past year? 0  3) How often did you have 6 or more drinks on one occasion in the past year? never    AUDIT-C Score: 0  Interpretation: Score 0-2 (female): Negative screen for alcohol misuse    Single Item Drug Screening:  How often have you used an illegal drug (including marijuana) or a prescription medication for non-medical reasons in the past year? never    Single Item Drug Screen Score: 0  Interpretation: Negative screen for possible drug use disorder    Social  "Drivers of Health     Financial Resource Strain: Low Risk  (6/27/2023)    Overall Financial Resource Strain (CARDIA)    • Difficulty of Paying Living Expenses: Not very hard   Food Insecurity: No Food Insecurity (7/9/2025)    Nursing - Inadequate Food Risk Classification    • Worried About Running Out of Food in the Last Year: Never true    • Ran Out of Food in the Last Year: Never true   Transportation Needs: No Transportation Needs (7/9/2025)    PRAPARE - Transportation    • Lack of Transportation (Medical): No    • Lack of Transportation (Non-Medical): No   Housing Stability: Low Risk  (7/9/2025)    Housing Stability Vital Sign    • Unable to Pay for Housing in the Last Year: No    • Number of Times Moved in the Last Year: 0    • Homeless in the Last Year: No   Utilities: Not At Risk (7/9/2025)    [facility] Utilities    • Threatened with loss of utilities: No     No results found.    Objective   /82   Pulse 80   Temp 97.5 °F (36.4 °C)   Ht 5' 3\" (1.6 m)   Wt 76.2 kg (168 lb)   SpO2 97%   BMI 29.76 kg/m²     Physical Exam  Vitals and nursing note reviewed.   Constitutional:       General: She is not in acute distress.     Appearance: She is well-developed.   HENT:      Head: Normocephalic and atraumatic.      Right Ear: Tympanic membrane, ear canal and external ear normal.      Left Ear: Tympanic membrane, ear canal and external ear normal.      Nose: Nose normal. No rhinorrhea.      Mouth/Throat:      Mouth: Mucous membranes are moist.      Pharynx: No oropharyngeal exudate or posterior oropharyngeal erythema.     Eyes:      Conjunctiva/sclera: Conjunctivae normal.       Cardiovascular:      Rate and Rhythm: Normal rate and regular rhythm.   Pulmonary:      Effort: Pulmonary effort is normal. No respiratory distress.      Breath sounds: Normal breath sounds.   Abdominal:      General: Bowel sounds are normal. There is no distension.      Palpations: Abdomen is soft.      Tenderness: There is no abdominal " tenderness.     Musculoskeletal:      Right lower leg: No edema.      Left lower leg: No edema.   Lymphadenopathy:      Cervical: No cervical adenopathy.     Skin:     General: Skin is warm and dry.     Neurological:      Mental Status: She is alert.      Comments: Grossly intact   Psychiatric:         Mood and Affect: Mood normal.

## 2025-07-14 NOTE — PATIENT INSTRUCTIONS
Medicare Preventive Visit Patient Instructions  Thank you for completing your Welcome to Medicare Visit or Medicare Annual Wellness Visit today. Your next wellness visit will be due in one year (7/15/2026).  The screening/preventive services that you may require over the next 5-10 years are detailed below. Some tests may not apply to you based off risk factors and/or age. Screening tests ordered at today's visit but not completed yet may show as past due. Also, please note that scanned in results may not display below.  Preventive Screenings:  Service Recommendations Previous Testing/Comments   Colorectal Cancer Screening  * Colonoscopy    * Fecal Occult Blood Test (FOBT)/Fecal Immunochemical Test (FIT)  * Fecal DNA/Cologuard Test  * Flexible Sigmoidoscopy Age: 45-75 years old   Colonoscopy: every 10 years (may be performed more frequently if at higher risk)  OR  FOBT/FIT: every 1 year  OR  Cologuard: every 3 years  OR  Sigmoidoscopy: every 5 years  Screening may be recommended earlier than age 45 if at higher risk for colorectal cancer. Also, an individualized decision between you and your healthcare provider will decide whether screening between the ages of 76-85 would be appropriate. Colonoscopy: Not on file  FOBT/FIT: Not on file  Cologuard: 07/10/2023  Sigmoidoscopy: Not on file    Screening Current     Breast Cancer Screening Age: 40+ years old  Frequency: every 1-2 years  Not required if history of left and right mastectomy Mammogram: 04/18/2024    Screening Current   Cervical Cancer Screening Between the ages of 21-29, pap smear recommended once every 3 years.   Between the ages of 30-65, can perform pap smear with HPV co-testing every 5 years.   Recommendations may differ for women with a history of total hysterectomy, cervical cancer, or abnormal pap smears in past. Pap Smear: Not on file    Screening Not Indicated   Hepatitis C Screening Once for adults born between 1945 and 1965  More frequently in  patients at high risk for Hepatitis C Hep C Antibody: Not on file        Diabetes Screening 1-2 times per year if you're at risk for diabetes or have pre-diabetes Fasting glucose: 95 mg/dL (6/28/2024)  A1C: No results in last 5 years (No results in last 5 years)      Cholesterol Screening Once every 5 years if you don't have a lipid disorder. May order more often based on risk factors. Lipid panel: 06/28/2024    Screening Not Indicated  History Lipid Disorder     Other Preventive Screenings Covered by Medicare:  Abdominal Aortic Aneurysm (AAA) Screening: covered once if your at risk. You're considered to be at risk if you have a family history of AAA.  Lung Cancer Screening: covers low dose CT scan once per year if you meet all of the following conditions: (1) Age 55-77; (2) No signs or symptoms of lung cancer; (3) Current smoker or have quit smoking within the last 15 years; (4) You have a tobacco smoking history of at least 20 pack years (packs per day multiplied by number of years you smoked); (5) You get a written order from a healthcare provider.  Glaucoma Screening: covered annually if you're considered high risk: (1) You have diabetes OR (2) Family history of glaucoma OR (3)  aged 50 and older OR (4)  American aged 65 and older  Osteoporosis Screening: covered every 2 years if you meet one of the following conditions: (1) You're estrogen deficient and at risk for osteoporosis based off medical history and other findings; (2) Have a vertebral abnormality; (3) On glucocorticoid therapy for more than 3 months; (4) Have primary hyperparathyroidism; (5) On osteoporosis medications and need to assess response to drug therapy.   Last bone density test (DXA Scan): Not on file.  HIV Screening: covered annually if you're between the age of 15-65. Also covered annually if you are younger than 15 and older than 65 with risk factors for HIV infection. For pregnant patients, it is covered up to 3  times per pregnancy.    Immunizations:  Immunization Recommendations   Influenza Vaccine Annual influenza vaccination during flu season is recommended for all persons aged >= 6 months who do not have contraindications   Pneumococcal Vaccine   * Pneumococcal conjugate vaccine = PCV13 (Prevnar 13), PCV15 (Vaxneuvance), PCV20 (Prevnar 20)  * Pneumococcal polysaccharide vaccine = PPSV23 (Pneumovax) Adults 19-63 yo with certain risk factors or if 65+ yo  If never received any pneumonia vaccine: recommend Prevnar 20 (PCV20)  Give PCV20 if previously received 1 dose of PCV13 or PPSV23   Hepatitis B Vaccine 3 dose series if at intermediate or high risk (ex: diabetes, end stage renal disease, liver disease)   Respiratory syncytial virus (RSV) Vaccine - COVERED BY MEDICARE PART D  * RSVPreF3 (Arexvy) CDC recommends that adults 60 years of age and older may receive a single dose of RSV vaccine using shared clinical decision-making (SCDM)   Tetanus (Td) Vaccine - COST NOT COVERED BY MEDICARE PART B Following completion of primary series, a booster dose should be given every 10 years to maintain immunity against tetanus. Td may also be given as tetanus wound prophylaxis.   Tdap Vaccine - COST NOT COVERED BY MEDICARE PART B Recommended at least once for all adults. For pregnant patients, recommended with each pregnancy.   Shingles Vaccine (Shingrix) - COST NOT COVERED BY MEDICARE PART B  2 shot series recommended in those 19 years and older who have or will have weakened immune systems or those 50 years and older     Health Maintenance Due:      Topic Date Due   • Hepatitis C Screening  Never done   • Breast Cancer Screening: Mammogram  04/18/2025   • Colorectal Cancer Screening  07/10/2026     Immunizations Due:      Topic Date Due   • COVID-19 Vaccine (4 - 2024-25 season) 09/01/2024   • Influenza Vaccine (1) 09/01/2025     Advance Directives   What are advance directives?  Advance directives are legal documents that state your  wishes and plans for medical care. These plans are made ahead of time in case you lose your ability to make decisions for yourself. Advance directives can apply to any medical decision, such as the treatments you want, and if you want to donate organs.   What are the types of advance directives?  There are many types of advance directives, and each state has rules about how to use them. You may choose a combination of any of the following:  Living will:  This is a written record of the treatment you want. You can also choose which treatments you do not want, which to limit, and which to stop at a certain time. This includes surgery, medicine, IV fluid, and tube feedings.   Durable power of  for healthcare (DPAHC):  This is a written record that states who you want to make healthcare choices for you when you are unable to make them for yourself. This person, called a proxy, is usually a family member or a friend. You may choose more than 1 proxy.  Do not resuscitate (DNR) order:  A DNR order is used in case your heart stops beating or you stop breathing. It is a request not to have certain forms of treatment, such as CPR. A DNR order may be included in other types of advance directives.  Medical directive:  This covers the care that you want if you are in a coma, near death, or unable to make decisions for yourself. You can list the treatments you want for each condition. Treatment may include pain medicine, surgery, blood transfusions, dialysis, IV or tube feedings, and a ventilator (breathing machine).  Values history:  This document has questions about your views, beliefs, and how you feel and think about life. This information can help others choose the care that you would choose.  Why are advance directives important?  An advance directive helps you control your care. Although spoken wishes may be used, it is better to have your wishes written down. Spoken wishes can be misunderstood, or not followed.  Treatments may be given even if you do not want them. An advance directive may make it easier for your family to make difficult choices about your care.   Weight Management   Why it is important to manage your weight:  Being overweight increases your risk of health conditions such as heart disease, high blood pressure, type 2 diabetes, and certain types of cancer. It can also increase your risk for osteoarthritis, sleep apnea, and other respiratory problems. Aim for a slow, steady weight loss. Even a small amount of weight loss can lower your risk of health problems.  How to lose weight safely:  A safe and healthy way to lose weight is to eat fewer calories and get regular exercise. You can lose up about 1 pound a week by decreasing the number of calories you eat by 500 calories each day.   Healthy meal plan for weight management:  A healthy meal plan includes a variety of foods, contains fewer calories, and helps you stay healthy. A healthy meal plan includes the following:  Eat whole-grain foods more often.  A healthy meal plan should contain fiber. Fiber is the part of grains, fruits, and vegetables that is not broken down by your body. Whole-grain foods are healthy and provide extra fiber in your diet. Some examples of whole-grain foods are whole-wheat breads and pastas, oatmeal, brown rice, and bulgur.  Eat a variety of vegetables every day.  Include dark, leafy greens such as spinach, kale, jessica greens, and mustard greens. Eat yellow and orange vegetables such as carrots, sweet potatoes, and winter squash.   Eat a variety of fruits every day.  Choose fresh or canned fruit (canned in its own juice or light syrup) instead of juice. Fruit juice has very little or no fiber.  Eat low-fat dairy foods.  Drink fat-free (skim) milk or 1% milk. Eat fat-free yogurt and low-fat cottage cheese. Try low-fat cheeses such as mozzarella and other reduced-fat cheeses.  Choose meat and other protein foods that are low in fat.   Choose beans or other legumes such as split peas or lentils. Choose fish, skinless poultry (chicken or turkey), or lean cuts of red meat (beef or pork). Before you cook meat or poultry, cut off any visible fat.   Use less fat and oil.  Try baking foods instead of frying them. Add less fat, such as margarine, sour cream, regular salad dressing and mayonnaise to foods. Eat fewer high-fat foods. Some examples of high-fat foods include french fries, doughnuts, ice cream, and cakes.  Eat fewer sweets.  Limit foods and drinks that are high in sugar. This includes candy, cookies, regular soda, and sweetened drinks.  Exercise:  Exercise at least 30 minutes per day on most days of the week. Some examples of exercise include walking, biking, dancing, and swimming. You can also fit in more physical activity by taking the stairs instead of the elevator or parking farther away from stores. Ask your healthcare provider about the best exercise plan for you.    © Copyright [employer] 2018 Information is for End User's use only and may not be sold, redistributed or otherwise used for commercial purposes. All illustrations and images included in CareNotes® are the copyrighted property of [employer] or [employer]

## 2025-07-17 ENCOUNTER — APPOINTMENT (OUTPATIENT)
Dept: LAB | Facility: CLINIC | Age: 70
End: 2025-07-17
Payer: MEDICARE

## 2025-07-17 DIAGNOSIS — E78.00 PURE HYPERCHOLESTEROLEMIA: ICD-10-CM

## 2025-07-17 LAB
ALBUMIN SERPL BCG-MCNC: 3.9 G/DL (ref 3.5–5)
ALP SERPL-CCNC: 78 U/L (ref 34–104)
ALT SERPL W P-5'-P-CCNC: 3 U/L (ref 7–52)
ANION GAP SERPL CALCULATED.3IONS-SCNC: 10 MMOL/L (ref 4–13)
AST SERPL W P-5'-P-CCNC: 17 U/L (ref 13–39)
BASOPHILS # BLD AUTO: 0.07 THOUSANDS/ÂΜL (ref 0–0.1)
BASOPHILS NFR BLD AUTO: 1 % (ref 0–1)
BILIRUB SERPL-MCNC: 0.56 MG/DL (ref 0.2–1)
BUN SERPL-MCNC: 14 MG/DL (ref 5–25)
CALCIUM SERPL-MCNC: 9.1 MG/DL (ref 8.4–10.2)
CHLORIDE SERPL-SCNC: 103 MMOL/L (ref 96–108)
CHOLEST SERPL-MCNC: 253 MG/DL (ref ?–200)
CO2 SERPL-SCNC: 27 MMOL/L (ref 21–32)
CREAT SERPL-MCNC: 0.97 MG/DL (ref 0.6–1.3)
EOSINOPHIL # BLD AUTO: 0.14 THOUSAND/ÂΜL (ref 0–0.61)
EOSINOPHIL NFR BLD AUTO: 2 % (ref 0–6)
ERYTHROCYTE [DISTWIDTH] IN BLOOD BY AUTOMATED COUNT: 12.6 % (ref 11.6–15.1)
GFR SERPL CREATININE-BSD FRML MDRD: 59 ML/MIN/1.73SQ M
GLUCOSE P FAST SERPL-MCNC: 96 MG/DL (ref 65–99)
HCT VFR BLD AUTO: 39.6 % (ref 34.8–46.1)
HDLC SERPL-MCNC: 62 MG/DL
HGB BLD-MCNC: 13.1 G/DL (ref 11.5–15.4)
IMM GRANULOCYTES # BLD AUTO: 0.01 THOUSAND/UL (ref 0–0.2)
IMM GRANULOCYTES NFR BLD AUTO: 0 % (ref 0–2)
LDLC SERPL CALC-MCNC: 167 MG/DL (ref 0–100)
LYMPHOCYTES # BLD AUTO: 3.53 THOUSANDS/ÂΜL (ref 0.6–4.47)
LYMPHOCYTES NFR BLD AUTO: 45 % (ref 14–44)
MCH RBC QN AUTO: 31 PG (ref 26.8–34.3)
MCHC RBC AUTO-ENTMCNC: 33.1 G/DL (ref 31.4–37.4)
MCV RBC AUTO: 94 FL (ref 82–98)
MONOCYTES # BLD AUTO: 0.54 THOUSAND/ÂΜL (ref 0.17–1.22)
MONOCYTES NFR BLD AUTO: 7 % (ref 4–12)
NEUTROPHILS # BLD AUTO: 3.65 THOUSANDS/ÂΜL (ref 1.85–7.62)
NEUTS SEG NFR BLD AUTO: 45 % (ref 43–75)
NONHDLC SERPL-MCNC: 191 MG/DL
NRBC BLD AUTO-RTO: 0 /100 WBCS
PLATELET # BLD AUTO: 306 THOUSANDS/UL (ref 149–390)
PMV BLD AUTO: 9.8 FL (ref 8.9–12.7)
POTASSIUM SERPL-SCNC: 4.1 MMOL/L (ref 3.5–5.3)
PROT SERPL-MCNC: 6.3 G/DL (ref 6.4–8.4)
RBC # BLD AUTO: 4.22 MILLION/UL (ref 3.81–5.12)
SODIUM SERPL-SCNC: 140 MMOL/L (ref 135–147)
TRIGL SERPL-MCNC: 119 MG/DL (ref ?–150)
WBC # BLD AUTO: 7.94 THOUSAND/UL (ref 4.31–10.16)

## 2025-07-17 PROCEDURE — 80061 LIPID PANEL: CPT

## 2025-07-17 PROCEDURE — 80053 COMPREHEN METABOLIC PANEL: CPT

## 2025-07-17 PROCEDURE — 85025 COMPLETE CBC W/AUTO DIFF WBC: CPT

## 2025-07-17 PROCEDURE — 36415 COLL VENOUS BLD VENIPUNCTURE: CPT

## 2025-07-24 DIAGNOSIS — G20.A1 PARKINSON'S DISEASE WITHOUT DYSKINESIA, UNSPECIFIED WHETHER MANIFESTATIONS FLUCTUATE (HCC): ICD-10-CM

## 2025-07-24 RX ORDER — CARBIDOPA AND LEVODOPA 25; 100 MG/1; MG/1
1 TABLET ORAL 3 TIMES DAILY
Qty: 270 TABLET | Refills: 3 | Status: SHIPPED | OUTPATIENT
Start: 2025-07-24